# Patient Record
Sex: MALE | Race: WHITE | NOT HISPANIC OR LATINO | Employment: OTHER | URBAN - METROPOLITAN AREA
[De-identification: names, ages, dates, MRNs, and addresses within clinical notes are randomized per-mention and may not be internally consistent; named-entity substitution may affect disease eponyms.]

---

## 2018-06-26 DIAGNOSIS — R31.0 GROSS HEMATURIA: Primary | ICD-10-CM

## 2018-06-27 ENCOUNTER — HOSPITAL ENCOUNTER (OUTPATIENT)
Dept: CT IMAGING | Facility: CLINIC | Age: 73
Discharge: HOME | End: 2018-06-27
Attending: UROLOGY | Admitting: UROLOGY
Payer: MEDICARE

## 2018-06-27 DIAGNOSIS — N20.1 CALCULUS OF DISTAL LEFT URETER: Primary | ICD-10-CM

## 2018-06-27 DIAGNOSIS — R31.0 GROSS HEMATURIA: ICD-10-CM

## 2018-06-27 PROCEDURE — 74176 CT ABD & PELVIS W/O CONTRAST: CPT

## 2018-06-27 RX ORDER — CIPROFLOXACIN 2 MG/ML
400 INJECTION, SOLUTION INTRAVENOUS
Status: CANCELLED | OUTPATIENT
Start: 2018-06-29

## 2018-06-28 RX ORDER — ERGOCALCIFEROL 1.25 MG/1
50000 CAPSULE, LIQUID FILLED ORAL
COMMUNITY
End: 2022-08-29

## 2018-06-28 RX ORDER — TRIAMTERENE/HYDROCHLOROTHIAZID 37.5-25 MG
1 TABLET ORAL DAILY
Status: ON HOLD | COMMUNITY
End: 2018-06-29

## 2018-06-29 ENCOUNTER — ANESTHESIA (OUTPATIENT)
Dept: SURGERY | Facility: CLINIC | Age: 73
End: 2018-06-29
Payer: MEDICARE

## 2018-06-29 ENCOUNTER — APPOINTMENT (OUTPATIENT)
Dept: GENERAL RADIOLOGY | Facility: CLINIC | Age: 73
End: 2018-06-29
Attending: UROLOGY
Payer: MEDICARE

## 2018-06-29 ENCOUNTER — ANESTHESIA EVENT (OUTPATIENT)
Dept: SURGERY | Facility: CLINIC | Age: 73
End: 2018-06-29
Payer: MEDICARE

## 2018-06-29 ENCOUNTER — SURGERY (OUTPATIENT)
Age: 73
End: 2018-06-29

## 2018-06-29 ENCOUNTER — HOSPITAL ENCOUNTER (OUTPATIENT)
Facility: CLINIC | Age: 73
Discharge: HOME | End: 2018-06-29
Attending: UROLOGY | Admitting: UROLOGY
Payer: MEDICARE

## 2018-06-29 VITALS
OXYGEN SATURATION: 92 % | WEIGHT: 201 LBS | RESPIRATION RATE: 16 BRPM | TEMPERATURE: 97.9 F | HEIGHT: 67 IN | SYSTOLIC BLOOD PRESSURE: 134 MMHG | BODY MASS INDEX: 31.55 KG/M2 | DIASTOLIC BLOOD PRESSURE: 83 MMHG

## 2018-06-29 DIAGNOSIS — N20.1 CALCULUS OF DISTAL LEFT URETER: ICD-10-CM

## 2018-06-29 PROCEDURE — 36000056 ZZH SURGERY LEVEL 3 1ST 30 MIN: Performed by: UROLOGY

## 2018-06-29 PROCEDURE — 88300 SURGICAL PATH GROSS: CPT | Mod: 26 | Performed by: UROLOGY

## 2018-06-29 PROCEDURE — 71000012 ZZH RECOVERY PHASE 1 LEVEL 1 FIRST HR: Performed by: UROLOGY

## 2018-06-29 PROCEDURE — 36000058 ZZH SURGERY LEVEL 3 EA 15 ADDTL MIN: Performed by: UROLOGY

## 2018-06-29 PROCEDURE — C2617 STENT, NON-COR, TEM W/O DEL: HCPCS | Performed by: UROLOGY

## 2018-06-29 PROCEDURE — 37000009 ZZH ANESTHESIA TECHNICAL FEE, EACH ADDTL 15 MIN: Performed by: UROLOGY

## 2018-06-29 PROCEDURE — 25000128 H RX IP 250 OP 636: Performed by: NURSE ANESTHETIST, CERTIFIED REGISTERED

## 2018-06-29 PROCEDURE — C1769 GUIDE WIRE: HCPCS | Performed by: UROLOGY

## 2018-06-29 PROCEDURE — 25000128 H RX IP 250 OP 636: Performed by: UROLOGY

## 2018-06-29 PROCEDURE — 25000128 H RX IP 250 OP 636: Performed by: ANESTHESIOLOGY

## 2018-06-29 PROCEDURE — 52356 CYSTO/URETERO W/LITHOTRIPSY: CPT | Mod: LT | Performed by: UROLOGY

## 2018-06-29 PROCEDURE — C1758 CATHETER, URETERAL: HCPCS | Performed by: UROLOGY

## 2018-06-29 PROCEDURE — 40000170 ZZH STATISTIC PRE-PROCEDURE ASSESSMENT II: Performed by: UROLOGY

## 2018-06-29 PROCEDURE — 27210794 ZZH OR GENERAL SUPPLY STERILE: Performed by: UROLOGY

## 2018-06-29 PROCEDURE — C1726 CATH, BAL DIL, NON-VASCULAR: HCPCS | Performed by: UROLOGY

## 2018-06-29 PROCEDURE — 71000027 ZZH RECOVERY PHASE 2 EACH 15 MINS: Performed by: UROLOGY

## 2018-06-29 PROCEDURE — 82365 CALCULUS SPECTROSCOPY: CPT | Performed by: UROLOGY

## 2018-06-29 PROCEDURE — 37000008 ZZH ANESTHESIA TECHNICAL FEE, 1ST 30 MIN: Performed by: UROLOGY

## 2018-06-29 PROCEDURE — 40000277 XR SURGERY CARM FLUORO LESS THAN 5 MIN W STILLS

## 2018-06-29 PROCEDURE — 27210995 ZZH RX 272: Performed by: UROLOGY

## 2018-06-29 PROCEDURE — 88300 SURGICAL PATH GROSS: CPT | Performed by: UROLOGY

## 2018-06-29 PROCEDURE — 25000125 ZZHC RX 250: Performed by: NURSE ANESTHETIST, CERTIFIED REGISTERED

## 2018-06-29 DEVICE — STENT URETERAL CONTOUR SOFT PERCUFLEX 6FRX24CM: Type: IMPLANTABLE DEVICE | Status: FUNCTIONAL

## 2018-06-29 RX ORDER — SODIUM CHLORIDE, SODIUM LACTATE, POTASSIUM CHLORIDE, CALCIUM CHLORIDE 600; 310; 30; 20 MG/100ML; MG/100ML; MG/100ML; MG/100ML
INJECTION, SOLUTION INTRAVENOUS CONTINUOUS PRN
Status: DISCONTINUED | OUTPATIENT
Start: 2018-06-29 | End: 2018-06-29

## 2018-06-29 RX ORDER — SODIUM CHLORIDE, SODIUM LACTATE, POTASSIUM CHLORIDE, CALCIUM CHLORIDE 600; 310; 30; 20 MG/100ML; MG/100ML; MG/100ML; MG/100ML
INJECTION, SOLUTION INTRAVENOUS CONTINUOUS
Status: DISCONTINUED | OUTPATIENT
Start: 2018-06-29 | End: 2018-06-29 | Stop reason: HOSPADM

## 2018-06-29 RX ORDER — ONDANSETRON 4 MG/1
4 TABLET, ORALLY DISINTEGRATING ORAL EVERY 30 MIN PRN
Status: DISCONTINUED | OUTPATIENT
Start: 2018-06-29 | End: 2018-06-29 | Stop reason: HOSPADM

## 2018-06-29 RX ORDER — FENTANYL CITRATE 50 UG/ML
INJECTION, SOLUTION INTRAMUSCULAR; INTRAVENOUS PRN
Status: DISCONTINUED | OUTPATIENT
Start: 2018-06-29 | End: 2018-06-29

## 2018-06-29 RX ORDER — FENTANYL CITRATE 50 UG/ML
25-50 INJECTION, SOLUTION INTRAMUSCULAR; INTRAVENOUS
Status: DISCONTINUED | OUTPATIENT
Start: 2018-06-29 | End: 2018-06-29 | Stop reason: HOSPADM

## 2018-06-29 RX ORDER — LIDOCAINE HYDROCHLORIDE 20 MG/ML
INJECTION, SOLUTION INFILTRATION; PERINEURAL PRN
Status: DISCONTINUED | OUTPATIENT
Start: 2018-06-29 | End: 2018-06-29

## 2018-06-29 RX ORDER — HYDRALAZINE HYDROCHLORIDE 20 MG/ML
2.5-5 INJECTION INTRAMUSCULAR; INTRAVENOUS EVERY 10 MIN PRN
Status: DISCONTINUED | OUTPATIENT
Start: 2018-06-29 | End: 2018-06-29 | Stop reason: HOSPADM

## 2018-06-29 RX ORDER — MEPERIDINE HYDROCHLORIDE 25 MG/ML
12.5 INJECTION INTRAMUSCULAR; INTRAVENOUS; SUBCUTANEOUS
Status: DISCONTINUED | OUTPATIENT
Start: 2018-06-29 | End: 2018-06-29 | Stop reason: HOSPADM

## 2018-06-29 RX ORDER — ALBUTEROL SULFATE 0.83 MG/ML
2.5 SOLUTION RESPIRATORY (INHALATION) EVERY 4 HOURS PRN
Status: DISCONTINUED | OUTPATIENT
Start: 2018-06-29 | End: 2018-06-29 | Stop reason: HOSPADM

## 2018-06-29 RX ORDER — ONDANSETRON 2 MG/ML
INJECTION INTRAMUSCULAR; INTRAVENOUS PRN
Status: DISCONTINUED | OUTPATIENT
Start: 2018-06-29 | End: 2018-06-29

## 2018-06-29 RX ORDER — ONDANSETRON 2 MG/ML
4 INJECTION INTRAMUSCULAR; INTRAVENOUS EVERY 30 MIN PRN
Status: DISCONTINUED | OUTPATIENT
Start: 2018-06-29 | End: 2018-06-29 | Stop reason: HOSPADM

## 2018-06-29 RX ORDER — PROPOFOL 10 MG/ML
INJECTION, EMULSION INTRAVENOUS PRN
Status: DISCONTINUED | OUTPATIENT
Start: 2018-06-29 | End: 2018-06-29

## 2018-06-29 RX ORDER — DEXAMETHASONE SODIUM PHOSPHATE 4 MG/ML
INJECTION, SOLUTION INTRA-ARTICULAR; INTRALESIONAL; INTRAMUSCULAR; INTRAVENOUS; SOFT TISSUE PRN
Status: DISCONTINUED | OUTPATIENT
Start: 2018-06-29 | End: 2018-06-29

## 2018-06-29 RX ORDER — CIPROFLOXACIN 500 MG/1
500 TABLET, FILM COATED ORAL EVERY 12 HOURS
Qty: 4 TABLET | Refills: 0 | Status: SHIPPED | OUTPATIENT
Start: 2018-06-29 | End: 2022-08-29

## 2018-06-29 RX ORDER — ALFUZOSIN HYDROCHLORIDE 10 MG/1
10 TABLET, EXTENDED RELEASE ORAL DAILY
Qty: 60 TABLET | Refills: 5 | Status: SHIPPED | OUTPATIENT
Start: 2018-06-29 | End: 2018-08-17

## 2018-06-29 RX ORDER — NALOXONE HYDROCHLORIDE 0.4 MG/ML
.1-.4 INJECTION, SOLUTION INTRAMUSCULAR; INTRAVENOUS; SUBCUTANEOUS
Status: DISCONTINUED | OUTPATIENT
Start: 2018-06-29 | End: 2018-06-29 | Stop reason: HOSPADM

## 2018-06-29 RX ORDER — CIPROFLOXACIN 2 MG/ML
400 INJECTION, SOLUTION INTRAVENOUS
Status: COMPLETED | OUTPATIENT
Start: 2018-06-29 | End: 2018-06-29

## 2018-06-29 RX ORDER — PROPOFOL 10 MG/ML
INJECTION, EMULSION INTRAVENOUS CONTINUOUS PRN
Status: DISCONTINUED | OUTPATIENT
Start: 2018-06-29 | End: 2018-06-29

## 2018-06-29 RX ORDER — HYDROMORPHONE HYDROCHLORIDE 1 MG/ML
.3-.5 INJECTION, SOLUTION INTRAMUSCULAR; INTRAVENOUS; SUBCUTANEOUS EVERY 10 MIN PRN
Status: DISCONTINUED | OUTPATIENT
Start: 2018-06-29 | End: 2018-06-29 | Stop reason: HOSPADM

## 2018-06-29 RX ADMIN — SODIUM CHLORIDE, POTASSIUM CHLORIDE, SODIUM LACTATE AND CALCIUM CHLORIDE: 600; 310; 30; 20 INJECTION, SOLUTION INTRAVENOUS at 14:37

## 2018-06-29 RX ADMIN — FENTANYL CITRATE 50 MCG: 50 INJECTION INTRAMUSCULAR; INTRAVENOUS at 15:45

## 2018-06-29 RX ADMIN — SODIUM CHLORIDE, POTASSIUM CHLORIDE, SODIUM LACTATE AND CALCIUM CHLORIDE: 600; 310; 30; 20 INJECTION, SOLUTION INTRAVENOUS at 15:22

## 2018-06-29 RX ADMIN — PROPOFOL 175 MCG/KG/MIN: 10 INJECTION, EMULSION INTRAVENOUS at 14:39

## 2018-06-29 RX ADMIN — FENTANYL CITRATE 50 MCG: 50 INJECTION INTRAMUSCULAR; INTRAVENOUS at 15:52

## 2018-06-29 RX ADMIN — DEXAMETHASONE SODIUM PHOSPHATE 4 MG: 4 INJECTION, SOLUTION INTRA-ARTICULAR; INTRALESIONAL; INTRAMUSCULAR; INTRAVENOUS; SOFT TISSUE at 14:45

## 2018-06-29 RX ADMIN — WATER 3000 ML: 100 IRRIGANT IRRIGATION at 15:25

## 2018-06-29 RX ADMIN — PROPOFOL 200 MG: 10 INJECTION, EMULSION INTRAVENOUS at 14:39

## 2018-06-29 RX ADMIN — CIPROFLOXACIN 400 MG: 2 INJECTION INTRAVENOUS at 14:43

## 2018-06-29 RX ADMIN — LIDOCAINE HYDROCHLORIDE 80 MG: 20 INJECTION, SOLUTION INFILTRATION; PERINEURAL at 14:39

## 2018-06-29 RX ADMIN — MIDAZOLAM 2 MG: 1 INJECTION INTRAMUSCULAR; INTRAVENOUS at 14:37

## 2018-06-29 RX ADMIN — FENTANYL CITRATE 50 MCG: 50 INJECTION, SOLUTION INTRAMUSCULAR; INTRAVENOUS at 15:15

## 2018-06-29 RX ADMIN — FENTANYL CITRATE 50 MCG: 50 INJECTION, SOLUTION INTRAMUSCULAR; INTRAVENOUS at 14:39

## 2018-06-29 RX ADMIN — WATER 3000 ML: 100 IRRIGANT IRRIGATION at 14:50

## 2018-06-29 RX ADMIN — ONDANSETRON 4 MG: 2 INJECTION INTRAMUSCULAR; INTRAVENOUS at 14:46

## 2018-06-29 NOTE — OR NURSING
Glasses returned to pt.  1630  PNDS met, po per I&O sheet. Pt dressed, up in recliner and transported to Phase 2.

## 2018-06-29 NOTE — DISCHARGE INSTRUCTIONS
Use CPAP nightly  Cipro 500mg every 12 hours Sat and Sun  Hold Calcium and Vit D pill for next 3 days  Uroxatrol every day with a meal - this will allow you to urinate much better and help with discomfort from the stent  See me in 7-10 days to remove the stent  [facility] will call you with an appointment to see medical kidney stone specialist...please go!!    Same Day Surgery Discharge Instructions for  Sedation and General Anesthesia       It's not unusual to feel dizzy, light-headed or faint for up to 24 hours after surgery or while taking pain medication.  If you have these symptoms: sit for a few minutes before standing and have someone assist you when you get up to walk or use the bathroom.      You should rest and relax for the next 24 hours. We recommend you make arrangements to have an adult stay with you for at least 24 hours after your discharge.  Avoid hazardous and strenuous activity.      DO NOT DRIVE any vehicle or operate mechanical equipment for 24 hours following the end of your surgery.  Even though you may feel normal, your reactions may be affected by the medication you have received.      Do not drink alcoholic beverages for 24 hours following surgery.       Slowly progress to your regular diet as you feel able. It's not unusual to feel nauseated and/or vomit after receiving anesthesia.  If you develop these symptoms, drink clear liquids (apple juice, ginger ale, broth, 7-up, etc. ) until you feel better.  If your nausea and vomiting persists for 24 hours, please notify your surgeon.        All narcotic pain medications, along with inactivity and anesthesia, can cause constipation. Drinking plenty of liquids and increasing fiber intake will help.      For any questions of a medical nature, call your surgeon.      Do not make important decisions for 24 hours.      If you had general anesthesia, you may have a sore throat for a couple of days related to the breathing tube used during  surgery.  You may use Cepacol lozenges to help with this discomfort.  If it worsens or if you develop a fever, contact your surgeon.       If you feel your pain is not well managed with the pain medications prescribed by your surgeon, please contact your surgeon's office to let them know so they can address your concerns.       Cystoscopy and  Holmium Laser Discharge Instructions    Holmium laser lithotripsy was used to break up your kidney stone(s). It is normal to have visible blood in the urine, burning, urgency and frequency following this procedure. These symptoms may last for a few days to weeks.     Diet:    To help pass stone fragments and clear the blood out of the urine, it is important to drink 6-8 glasses of fluids per day at home - at least 3-4 glasses should be water.       Return to the diet that you were on before the procedure, unless you are given specific diet instructions.    Activity:    Walk short distances and increase as your strength allows.    You may climb stairs.    Do not do strenuous exercise or heavy lifting until approved by surgeon.    Do not drive while taking narcotic pain medications.    Bathing:    You may take a shower.           Call your physician if these signs/symptoms are present:    Pain that is not relieved by a short rest or ordered pain medications.    Temperature at or above 101.0 F or chills.    Inability or difficulty urinating.     Excessive blood in urine.    Any questions or concerns.          STENT INFORMATION SHEET  [facility], LYNSEY Barnes M.D.,STEFFI Boss M.D.,   NEW Dunlap M.D., ROSEMARY Durán M.D.   [phone]    During surgery, a stent may be place in the ureter.  The ureter is the tube that drains urine from the kidney to the bladder.  The stent is placed to dilate (open) the ureter so the stone fragments can pass easily through the ureter or to decrease ureteral swelling after surgery, or to relieve an obstruction.    The stent is made of  rubber.  The upper end of the stent curls in the kidney while the lower end rests in the bladder.    While the stent is in place you may experience the following symptoms:    Blood and/or small blood clots in urine.    Bladder spasm (frequency and urgency of urination).    Discomfort or aching in the back or side where the stent is.    Burning or discomfort at the end of urine stream.    To decrease these symptoms you should:    Take pain medication as prescribed.    Drink plenty of fluids.    If you experience pain at the end of urination try not emptying your bladder completely.    If having discomfort in back or side, decrease activity.    Please call your physician or the physician on call if you experience:    Fever greater than 101 .    Severe pain not relieved by pain medication or rest.      Please make an appointment for removal of the stent according to your physician s instructions.    **If you have questions or concerns about your procedure,  call Dr. Barnes at [phone]**

## 2018-06-29 NOTE — IP AVS SNAPSHOT
[facility] [address]    [place] [address]    Phone:  [phone]                                       After Visit Summary   6/29/2018    Carroll Riggins    MRN: [id number]           After Visit Summary Signature Page     I have received my discharge instructions, and my questions have been answered. I have discussed any challenges I see with this plan with the nurse or doctor.    ..........................................................................................................................................  Patient/Patient Representative Signature      ..........................................................................................................................................  Patient Representative Print Name and Relationship to Patient    ..................................................               ................................................  Date                                            Time    ..........................................................................................................................................  Reviewed by Signature/Title    ...................................................              ..............................................  Date                                                            Time

## 2018-06-29 NOTE — ANESTHESIA PREPROCEDURE EVALUATION
Procedure: Procedure(s):  COMBINED CYSTOSCOPY, URETEROSCOPY, LASER HOLMIUM LITHOTRIPSY URETER(S), INSERT STENT  Preop diagnosis: LEFT URETERAL STONE    Allergies   Allergen Reactions     Sulfa Drugs Rash     Pt thinks that he is not allergic to this anymore. He said that this was a child summers allergy and he takes flomax and doctor says it has sulfa in it.. So probably okay. Please Review.     Toprol Xl [Metoprolol] Other (See Comments)     Chronic fatigue      Erythromycin Rash     Penicillin V Rash     Past Medical History:   Diagnosis Date     Arthritis      Hyperlipidemia      Hypertension      Sleep apnea     uses CPAP     Past Surgical History:   Procedure Laterality Date     ARTHROPLASTY REVISION KNEE  6/30/2014    Procedure: ARTHROPLASTY REVISION KNEE;  Surgeon: John Henning MD;  Location: [facility] OR     EYE SURGERY      Lt cataract     ORTHOPEDIC SURGERY      Left. Right; partial  l shoulder     Prior to Admission medications    Medication Sig Start Date End Date Taking? Authorizing Provider   Calcium Carb-Cholecalciferol (CALCIUM + D3 PO) Take by mouth daily   Yes Reported, Patient   GABAPENTIN PO Take 300 mg by mouth At Bedtime   Yes Reported, Patient   Irbesartan (AVAPRO PO) Take 150 mg by mouth 2 times daily   Yes Reported, Patient   metroNIDAZOLE (METROCREAM) 0.75 % cream Apply topically 2 times daily   Yes Reported, Patient   triamterene-hydrochlorothiazide (MAXZIDE-25) 37.5-25 MG per tablet Take 1 tablet by mouth daily   Yes Reported, Patient   vitamin D (ERGOCALCIFEROL) 77357 UNIT capsule Take 50,000 Units by mouth   Yes Reported, Patient   ACETAMINOPHEN PO Take 650 mg by mouth every 6 hours as needed for pain    Unknown, Entered By History   Amlodipine Besylate-Valsartan (EXFORGE)  MG TABS Take 1 tablet by mouth daily    Reported, Patient   aspirin 81 MG tablet Take 81 mg by mouth daily    Reported, Patient   cefTAZidime 2 G SOLR Inject 2 g into the vein every 8 hours 7/29/14   Renard,  Jose Mendez MD   diazepam (VALIUM) 2 MG tablet Take 1.5-2.5 tablets (3-5 mg) by mouth every 6 hours as needed for other (spasms) 7/29/14   Jas Rodriguez MD   hydrochlorothiazide (MICROZIDE) 12.5 MG capsule Take 12.5 mg by mouth daily    Reported, Patient   ORDER FOR DME Equipment being ordered: CPAP    Unknown, Entered By History   ORDER FOR DME Equipment being ordered: Other: Hospital bed ( semi electric bed) rental  Treatment Diagnosis: S/P total knee replacement 7/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      Current Outpatient Prescriptions Ordered in Epic   Medication     Calcium Carb-Cholecalciferol (CALCIUM + D3 PO)     GABAPENTIN PO     Irbesartan (AVAPRO PO)     metroNIDAZOLE (METROCREAM) 0.75 % cream     triamterene-hydrochlorothiazide (MAXZIDE-25) 37.5-25 MG per tablet     vitamin D (ERGOCALCIFEROL) 07560 UNIT capsule     ACETAMINOPHEN PO     Amlodipine Besylate-Valsartan (EXFORGE)  MG TABS     aspirin 81 MG tablet     cefTAZidime 2 G SOLR     diazepam (VALIUM) 2 MG tablet     hydrochlorothiazide (MICROZIDE) 12.5 MG capsule     ORDER FOR DME     ORDER FOR DME     ORDER FOR DME     Tamsulosin HCl (FLOMAX PO)     zolpidem (AMBIEN) 10 MG tablet     Wt Readings from Last 1 Encounters:   07/25/14 85.3 kg (188 lb)     Temp Readings from Last 1 Encounters:   07/29/14 36.4  C (97.6  F) (Oral)     BP Readings from Last 6 Encounters:   07/29/14 123/70   07/07/14 155/81   07/03/14 122/74     Pulse Readings from Last 4 Encounters:   07/29/14 66   07/07/14 89   07/03/14 74     Resp Readings from  Last 1 Encounters:   07/29/14 16     SpO2 Readings from Last 1 Encounters:   07/29/14 97%     ECG: left axis deviation    Anesthesia Evaluation     .             ROS/MED HX    ENT/Pulmonary:     (+)sleep apnea, uses CPAP , . .   (-) tobacco use   Neurologic:  - neg neurologic ROS     Cardiovascular:     (+) Dyslipidemia, hypertension----. : . . . :. .       METS/Exercise Tolerance:     Hematologic:  - neg hematologic  ROS       Musculoskeletal:  - neg musculoskeletal ROS       GI/Hepatic:        (-) GERD   Renal/Genitourinary:     (+) Nephrolithiasis ,       Endo:  - neg endo ROS       Psychiatric:  - neg psychiatric ROS       Infectious Disease:         Malignancy:         Other:                     Physical Exam  Normal systems: dental    Airway   Mallampati: III  TM distance: >3 FB  Neck ROM: full    Dental     Cardiovascular   Rhythm and rate: regular and normal      Pulmonary    breath sounds clear to auscultation                    Anesthesia Plan      History & Physical Review      ASA Status:  2 .        Plan for General and LMA with Propofol induction. Maintenance will be Balanced.    PONV prophylaxis:  Ondansetron (or other 5HT-3)       Postoperative Care  Postoperative pain management:  Multi-modal analgesia.      Consents  Anesthetic plan, risks, benefits and alternatives discussed with:  Patient..                          .

## 2018-06-29 NOTE — BRIEF OP NOTE
[facility] Brief Operative Note    Pre-operative diagnosis: LEFT URETERAL STONE   Post-operative diagnosis left ureteral calculi     Procedure: EUA, video cystoscopy, balloon dilation of left ureter, holmium laser lithotripsy, stone extractions, left JJ stent (6F X 24cm)   Surgeon(s): Surgeon(s) and Role: Nato Barnes MD - Primary   Estimated blood loss: 5 mL    Specimens:   ID Type Source Tests Collected by Time Destination   1 : LEFT URETERAL STONES Calculus/Stone Ureter, Left STONE ANALYSIS Nato Barnes MD 6/29/2018  3:15 PM       Findings: 2 large stones

## 2018-06-29 NOTE — ANESTHESIA CARE TRANSFER NOTE
Patient: Carroll Riggins    Procedure(s):  VIDEO CYSTOSCOPY LEFT URETEROSCOPY HOLMIUM LASER LITHOTRIPSY AND LEFT STENT  - Wound Class: II-Clean Contaminated    Diagnosis: LEFT URETERAL STONE  Diagnosis Additional Information: No value filed.    Anesthesia Type:   No value filed.     Note:  Airway :Face Mask  Patient transferred to:PACU  Comments: Anesthesia Care Note    Patient: Carroll Riggins    Transferred to: PACU    Patient vital signs: Stable    Airway: FM    Monitors placed. VSS. PIV patent. No change in dentition. Report given to GERBER.      Cassandra Lora CRNA   6/29/2018  Handoff Report: Identifed the Patient, Identified the Reponsible Provider, Reviewed the pertinent medical history, Discussed the surgical course, Reviewed Intra-OP anesthesia mangement and issues during anesthesia, Set expectations for post-procedure period and Allowed opportunity for questions and acknowledgement of understanding      Vitals: (Last set prior to Anesthesia Care Transfer)    CRNA VITALS  6/29/2018 1504 - 6/29/2018 1541      6/29/2018             SpO2: 96 %    Resp Rate (observed): (!)  3    Resp Rate (set): 10                Electronically Signed By: TAMI Ramos CRNA  June 29, 2018  3:41 PM

## 2018-06-29 NOTE — IP AVS SNAPSHOT
MRN:[id number]                      After Visit Summary   6/29/2018    Carroll Riggins    MRN: [id number]           Thank you!     Thank you for choosing [facility] for your care. Our goal is always to provide you with excellent care. Hearing back from our patients is one way we can continue to improve our services. Please take a few minutes to complete the written survey that you may receive in the mail after you visit with us. Thank you!        Patient Information     Date Of Birth          1945        About your hospital stay     You were admitted on:  June 29, 2018 You last received care in the:  [facility] PACU    You were discharged on:  June 29, 2018       Who to Call     For medical emergencies, please call 911.  For non-urgent questions about your medical care, please call your primary care provider or clinic, [phone]  For questions related to your surgery, please call your surgery clinic        Attending Provider     Provider Specialty    Nato Barnes MD Urology       Primary Care Provider Office Phone # Fax #    Farhad Luna [phone]      Further instructions from your care team       Use CPAP nightly  Cipro 500mg every 12 hours Sat and Sun  Hold Calcium and Vit D pill for next 3 days  Uroxatrol every day with a meal - this will allow you to urinate much better and help with discomfort from the stent  See me in 7-10 days to remove the stent  [facility] will call you with an appointment to see medical kidney stone specialist...please go!!    Same Day Surgery Discharge Instructions for  Sedation and General Anesthesia       It's not unusual to feel dizzy, light-headed or faint for up to 24 hours after surgery or while taking pain medication.  If you have these symptoms: sit for a few minutes before standing and have someone assist you when you get up to walk or use the bathroom.      You should rest and relax for the next 24  hours. We recommend you make arrangements to have an adult stay with you for at least 24 hours after your discharge.  Avoid hazardous and strenuous activity.      DO NOT DRIVE any vehicle or operate mechanical equipment for 24 hours following the end of your surgery.  Even though you may feel normal, your reactions may be affected by the medication you have received.      Do not drink alcoholic beverages for 24 hours following surgery.       Slowly progress to your regular diet as you feel able. It's not unusual to feel nauseated and/or vomit after receiving anesthesia.  If you develop these symptoms, drink clear liquids (apple juice, ginger ale, broth, 7-up, etc. ) until you feel better.  If your nausea and vomiting persists for 24 hours, please notify your surgeon.        All narcotic pain medications, along with inactivity and anesthesia, can cause constipation. Drinking plenty of liquids and increasing fiber intake will help.      For any questions of a medical nature, call your surgeon.      Do not make important decisions for 24 hours.      If you had general anesthesia, you may have a sore throat for a couple of days related to the breathing tube used during surgery.  You may use Cepacol lozenges to help with this discomfort.  If it worsens or if you develop a fever, contact your surgeon.       If you feel your pain is not well managed with the pain medications prescribed by your surgeon, please contact your surgeon's office to let them know so they can address your concerns.       Cystoscopy and  Holmium Laser Discharge Instructions    Holmium laser lithotripsy was used to break up your kidney stone(s). It is normal to have visible blood in the urine, burning, urgency and frequency following this procedure. These symptoms may last for a few days to weeks.     Diet:    To help pass stone fragments and clear the blood out of the urine, it is important to drink 6-8 glasses of fluids per day at home - at least  3-4 glasses should be water.       Return to the diet that you were on before the procedure, unless you are given specific diet instructions.    Activity:    Walk short distances and increase as your strength allows.    You may climb stairs.    Do not do strenuous exercise or heavy lifting until approved by surgeon.    Do not drive while taking narcotic pain medications.    Bathing:    You may take a shower.           Call your physician if these signs/symptoms are present:    Pain that is not relieved by a short rest or ordered pain medications.    Temperature at or above 101.0 F or chills.    Inability or difficulty urinating.     Excessive blood in urine.    Any questions or concerns.          STENT INFORMATION SHEET  [facility], LYNSEY Barnes M.D.,STEFFI Boss M.D.,   NEW Dunlap M.D., ROSEMARY Durán M.D.   [phone]    During surgery, a stent may be place in the ureter.  The ureter is the tube that drains urine from the kidney to the bladder.  The stent is placed to dilate (open) the ureter so the stone fragments can pass easily through the ureter or to decrease ureteral swelling after surgery, or to relieve an obstruction.    The stent is made of rubber.  The upper end of the stent curls in the kidney while the lower end rests in the bladder.    While the stent is in place you may experience the following symptoms:    Blood and/or small blood clots in urine.    Bladder spasm (frequency and urgency of urination).    Discomfort or aching in the back or side where the stent is.    Burning or discomfort at the end of urine stream.    To decrease these symptoms you should:    Take pain medication as prescribed.    Drink plenty of fluids.    If you experience pain at the end of urination try not emptying your bladder completely.    If having discomfort in back or side, decrease activity.    Please call your physician or the physician on call if you experience:    Fever greater than 101 .    Severe pain  "not relieved by pain medication or rest.      Please make an appointment for removal of the stent according to your physician s instructions.    **If you have questions or concerns about your procedure,  call Dr. Barnes at [phone]**              Pending Results     Date and Time Order Name Status Description    6/29/2018 1550 Surgical pathology exam In process     6/29/2018 1531 XR Surgery FRANKIE L/T 5 Min Fluoro w Stills In process     6/29/2018 1521 Stone analysis In process             Admission Information     Date & Time Provider Department Dept. Phone    6/29/2018 Nato Barnes MD [facility] PACU [phone]      Your Vitals Were     Blood Pressure Temperature Respirations Height Weight Pulse Oximetry    132/89 97.9  F (36.6  C) (Temporal) 16 1.702 m (5' 7\") 91.2 kg (201 lb) 93%    BMI (Body Mass Index)                   31.48 kg/m2           [employer] Information     [employer] gives you secure access to your electronic health record. If you see a primary care provider, you can also send messages to your care team and make appointments. If you have questions, please call your primary care clinic.  If you do not have a primary care provider, please call [phone] and they will assist you.        Care EveryWhere ID     This is your Care EveryWhere ID. This could be used by other organizations to access your [facility] medical records  [id number]        Equal Access to Services     DAYANA KELLY : Hadii mg Duarte, waaxda luqadaha, qaybta juvenalaldaniel casey . So [facility] [place] [phone].    ATENCIÓN: Si habla español, tiene a meadows disposición servicios gratuitos de asistencia lingüística. Llame al [phone].    We comply with applicable federal civil rights laws and [place] laws. We do not discriminate on the basis of race, color, national origin, age, disability, sex, sexual orientation, or gender identity.               Review of your medicines "      START taking        Dose / Directions    alfuzosin 10 MG 24 hr tablet   Commonly known as:  UROXATRAL   Used for:  Calculus of distal left ureter        Dose:  10 mg   Take 1 tablet (10 mg) by mouth daily With meal   Quantity:  60 tablet   Refills:  5       ciprofloxacin 500 MG tablet   Commonly known as:  CIPRO   Used for:  Calculus of distal left ureter        Dose:  500 mg   Take 1 tablet (500 mg) by mouth every 12 hours Start Saturday morning   Quantity:  4 tablet   Refills:  0         CONTINUE these medicines which have NOT CHANGED        Dose / Directions    ACETAMINOPHEN PO        Dose:  650 mg   Take 650 mg by mouth every 6 hours as needed for pain   Refills:  0       AMLODIPINE BESYLATE PO        Dose:  2.5 mg   Take 2.5 mg by mouth daily   Refills:  0       AVAPRO PO        Dose:  150 mg   Take 150 mg by mouth 2 times daily   Refills:  0       CALCIUM + D3 PO        Take by mouth daily   Refills:  0       diazepam 2 MG tablet   Commonly known as:  VALIUM   Used for:  Arthritis of knee, right        Dose:  2.5-5 mg   Take 1.5-2.5 tablets (3-5 mg) by mouth every 6 hours as needed for other (spasms)   Quantity:  20 tablet   Refills:  0       GABAPENTIN PO        Dose:  300 mg   Take 300 mg by mouth At Bedtime   Refills:  0       hydrochlorothiazide 12.5 MG capsule   Commonly known as:  MICROZIDE        Dose:  12.5 mg   Take 12.5 mg by mouth daily   Refills:  0       metroNIDAZOLE 0.75 % cream   Commonly known as:  METROCREAM        Apply topically 2 times daily   Refills:  0       NONFORMULARY        as needed CBD drops(cannibus)   Refills:  0       order for DME        Equipment being ordered: CPAP   Refills:  0       order for DME   Used for:  Arthritis of knee        Equipment being ordered: Walker Wheels () and Walker () Treatment Diagnosis: Impaired gait   Quantity:  1 each   Refills:  0       order for DME   Used for:  Arthritis of knee, right        Equipment being ordered: Other:  Hospital bed ( semi electric bed) rental Treatment Diagnosis: S/P total knee replacement   Quantity:  1 Units   Refills:  0       vitamin D 66720 UNIT capsule   Commonly known as:  ERGOCALCIFEROL        Dose:  88500 Units   Take 50,000 Units by mouth   Refills:  0         STOP taking     aspirin 81 MG tablet                Where to get your medicines      These medications were sent to [facility] [place], [place] - [address]  [address], [place] [address]     Phone:  [phone]     alfuzosin 10 MG 24 hr tablet    ciprofloxacin 500 MG tablet                Protect others around you: Learn how to safely use, store and throw away your medicines at www.disposemymeds.org.        ANTIBIOTIC INSTRUCTION     You've Been Prescribed an Antibiotic - Now What?  Your healthcare team thinks that you or your loved one might have an infection. Some infections can be treated with antibiotics, which are powerful, life-saving drugs. Like all medications, antibiotics have side effects and should only be used when necessary. There are some important things you should know about your antibiotic treatment.      Your healthcare team may run tests before you start taking an antibiotic.    Your team may take samples (e.g., from your blood, urine or other areas) to run tests to look for bacteria. These test can be important to determine if you need an antibiotic at all and, if you do, which antibiotic will work best.      Within a few days, your healthcare team might change or even stop your antibiotic.    Your team may start you on an antibiotic while they are working to find out what is making you sick.    Your team might change your antibiotic because test results show that a different antibiotic would be better to treat your infection.    In some cases, once your team has more information, they learn that you do not need an antibiotic at all. They may find out that you don't have an infection, or that  the antibiotic you're taking won't work against your infection. For example, an infection caused by a virus can't be treated with antibiotics. Staying on an antibiotic when you don't need it is more likely to be harmful than helpful.      You may experience side effects from your antibiotic.    Like all medications, antibiotics have side effects. Some of these can be serious.    Let you healthcare team know if you have any known allergies when you are admitted to the hospital.    One significant side effect of nearly all antibiotics is the risk of severe and sometimes deadly diarrhea caused by Clostridium difficile (C. Difficile). This occurs when a person takes antibiotics because some good germs are destroyed. Antibiotic use allows C. diificile to take over, putting patients at high risk for this serious infection.    As a patient or caregiver, it is important to understand your or your loved one's antibiotic treatment. It is especially important for caregivers to speak up when patients can't speak for themselves. Here are some important questions to ask your healthcare team.    What infection is this antibiotic treating and how do you know I have that infection?    What side effects might occur from this antibiotic?    How long will I need to take this antibiotic?    Is it safe to take this antibiotic with other medications or supplements (e.g., vitamins) that I am taking?     Are there any special directions I need to know about taking this antibiotic? For example, should I take it with food?    How will I be monitored to know whether my infection is responding to the antibiotic?    What tests may help to make sure the right antibiotic is prescribed for me?      Information provided by:  www.cdc.gov/getsmart  U.S. Department of Health and Human Services  Centers for disease Control and Prevention  National Center for Emerging and Zoonotic Infectious Diseases  Division of Healthcare Quality Promotion              Medication List: This is a list of all your medications and when to take them. Check marks below indicate your daily home schedule. Keep this list as a reference.      Medications           Morning Afternoon Evening Bedtime As Needed    ACETAMINOPHEN PO   Take 650 mg by mouth every 6 hours as needed for pain                                alfuzosin 10 MG 24 hr tablet   Commonly known as:  UROXATRAL   Take 1 tablet (10 mg) by mouth daily With meal                                AMLODIPINE BESYLATE PO   Take 2.5 mg by mouth daily                                AVAPRO PO   Take 150 mg by mouth 2 times daily                                CALCIUM + D3 PO   Take by mouth daily                                ciprofloxacin 500 MG tablet   Commonly known as:  CIPRO   Take 1 tablet (500 mg) by mouth every 12 hours Start Saturday morning                                diazepam 2 MG tablet   Commonly known as:  VALIUM   Take 1.5-2.5 tablets (3-5 mg) by mouth every 6 hours as needed for other (spasms)                                GABAPENTIN PO   Take 300 mg by mouth At Bedtime                                hydrochlorothiazide 12.5 MG capsule   Commonly known as:  MICROZIDE   Take 12.5 mg by mouth daily                                metroNIDAZOLE 0.75 % cream   Commonly known as:  METROCREAM   Apply topically 2 times daily                                NONFORMULARY   as needed CBD drops(cannibus)                                order for DME   Equipment being ordered: CPAP                                order for DME   Equipment being ordered: Walker Wheels () and Walker () Treatment Diagnosis: Impaired gait                                order for DME   Equipment being ordered: Other: Hospital bed ( semi electric bed) rental Treatment Diagnosis: S/P total knee replacement                                vitamin D 17062 UNIT capsule   Commonly known as:  ERGOCALCIFEROL   Take 50,000 Units by mouth

## 2018-06-29 NOTE — OP NOTE
Procedure Date: 06/29/2018      PREOPERATIVE DIAGNOSIS:  Two large left ureteral calculi.      POSTOPERATIVE DIAGNOSIS:  Two large left ureteral calculi.      PROCEDURES:  EUA, video cystopanendoscopy, balloon dilation left ureter, left ureteroscopy, holmium laser lithotripsy, stone extractions, left double-J stent (6-[place]ome[place] x 24 cm).      SURGEON:  Nato Barnes Jr., MD      ANESTHESIA:  General laryngeal mask.      ESTIMATED BLOOD LOSS:  5 mL.      INDICATIONS:  The patient is a 72-year-old male who has had a long history of recurrent calcium urolithiasis and presents with the last week of pain on his left side.  A CT scan shows 2 large stones in the left ureter.  His renal function was normal at 1.12 in 10/2017.  CBC preoperatively was normal.  The patient has had normal serum calcium levels in the past.  Unfortunately, he has not gone to the nephrologist as I have asked him several times.  The patient now presents for stone extractions.  The procedure, alternatives, risks and followup were carefully discussed.      PROCEDURE:  The patient received Cipro 400 mg IV preoperatively.  He was taken to the cystoscopy suite and placed supine on the operating table.  After adequate general laryngeal mask anesthesia, the patient was placed in lithotomy position and his genitalia were prepped and draped in a sterile fashion.  A 22-[place] Storz cystoscope with 30-degree lens and video were used to visualize the urethra and bladder, using water as an irrigant.  The urethra appeared normal.  The prostatic fossa was visually obstructed by lateral lobes of the prostate, a very small middle lobe.  The prostatic mucosa was normal.  The bladder revealed 2+ trabeculation with no stones and normal ureteral orifices.  The left ureteral orifice was catheterized with a Glidewire and this was passed under fluoroscopy, past both stones into the left renal pelvis where it curled.  I then passed a 6 mm balloon catheter over the Glidewire  across the left ureteral orifice and intramural ureter and gently dilated this for 20 seconds using thumb pressure only.  I deflated the balloon then and removed this and left the Glidewire as a safety wire.  I removed the cystoscope and passed the 6.9 Tanzanian Stortz ureteroscope into the bladder and up the ureter.        This first stone was encountered and was very large.  Using the 365 micron holmium laser fiber at 8 armas, I broke the stone into 4 or 5 pieces.  I then moved up to the second stone and broke this into 2 pieces using 8 armas.  I removed the laser fiber and using a helical basket, I extracted the distal stone fragments into the bladder.  I then went up more proximally and basketed out the 2 fragments of the upper ureteral stone.  These fragments were sent for analysis.  The other fragments were irrigated out of the bladder using the cystoscope.  The cystoscope was backloaded onto the Glidewire and a 6-Tanzanian x 24 cm soft contour stent was then placed over the Glidewire in excellent position.  The stent curled nicely in the left renal pelvis and in the bladder and there was efflux of clear urine.  The bladder was emptied and the cystoscope removed.  Rectal exam revealed a benign-feeling prostate and no rectal mass.         KIERAN ALVAREZ JR, MD             D: 2018   T: 2018   MT: CHRISTIAN      Name:     BRYN GARZA   MRN:      [id number]        Account:        [id number]   :      1945           Procedure Date: 2018      Document: [id number]       cc: Jacklyn Luna MD       [facility]

## 2018-07-02 LAB — COPATH REPORT: NORMAL

## 2018-07-06 ENCOUNTER — OFFICE VISIT (OUTPATIENT)
Dept: UROLOGY | Facility: CLINIC | Age: 73
End: 2018-07-06
Payer: MEDICARE

## 2018-07-06 VITALS
BODY MASS INDEX: 30.31 KG/M2 | SYSTOLIC BLOOD PRESSURE: 144 MMHG | WEIGHT: 200 LBS | HEART RATE: 85 BPM | HEIGHT: 68 IN | DIASTOLIC BLOOD PRESSURE: 80 MMHG

## 2018-07-06 DIAGNOSIS — Z79.2 PROPHYLACTIC ANTIBIOTIC: Primary | ICD-10-CM

## 2018-07-06 DIAGNOSIS — N20.1 CALCULUS OF URETER: ICD-10-CM

## 2018-07-06 PROCEDURE — 99024 POSTOP FOLLOW-UP VISIT: CPT | Performed by: UROLOGY

## 2018-07-06 PROCEDURE — 52310 CYSTOSCOPY AND TREATMENT: CPT | Mod: 58 | Performed by: UROLOGY

## 2018-07-06 RX ORDER — TRIAMTERENE/HYDROCHLOROTHIAZID 37.5-25 MG
TABLET ORAL
COMMUNITY
Start: 2017-06-14

## 2018-07-06 RX ORDER — CIPROFLOXACIN 500 MG/1
500 TABLET, FILM COATED ORAL ONCE
Qty: 1 TABLET | Refills: 0 | Status: SHIPPED | OUTPATIENT
Start: 2018-07-06 | End: 2018-07-06

## 2018-07-06 RX ORDER — ASPIRIN 81 MG/1
81 TABLET ORAL
COMMUNITY
Start: 2017-09-06

## 2018-07-06 ASSESSMENT — PAIN SCALES - GENERAL: PAINLEVEL: NO PAIN (0)

## 2018-07-06 NOTE — NURSING NOTE
Chief Complaint   Patient presents with     Cystoscopy     stent removal      Prior to the start of the procedure and with procedural staff participation, I verbally confirmed the patient s identity using two indicators, relevant allergies, that the procedure was appropriate and matched the consent or emergent situation, and that the correct equipment/implants were available. Immediately prior to starting the procedure I conducted the Time Out with the procedural staff and re-confirmed the patient s name, procedure, and site/side. I have wiped the patient off with the povidone-Iodine solution, draped them,  used Lidocaine hydrochloride jelly, and instilled sterile water into the bladder. (The Joint Commission universal protocol was followed.)  Yes    Sedation (Moderate or Deep): None  Rox Ordaz

## 2018-07-06 NOTE — MR AVS SNAPSHOT
"              After Visit Summary   7/6/2018    Carroll Riggins    MRN: [id number]           Patient Information     Date Of Birth          1945        Visit Information        Provider Department      7/6/2018 11:30 AM Nato Barnes MD; [facility] [place]        Today's Diagnoses     Prophylactic antibiotic    -  1    Calculus of ureter          Care Instructions         AFTER YOUR CYSTOSCOPY STENT REMOVAL         You have just completed a cystoscopy, or \"cysto\", which allowed your physician to learn more about your bladder (or to remove a stent placed after surgery). We suggest that you continue to avoid caffeine, fruit juice, and alcohol for the next 24 hours, however, you are encouraged to return to your normal activities.       A few things that are considered normal after your cystoscopy:    * small amount of bleeding (or spotting) that clears within the next 24 hours    * slight burning sensation with urination    * sensation to of needing to avoid more frequently    * the feeling of \"air\" in your urine    * mild discomfort that is relieved with Tylonol        Please contact our office promptly if you:    * develop a fever above 101 degrees    * are unable to urinate    * develop bright red blood that does not stop    * severe pain or swelling        And of course, please contact our office with any concerns or questions [phone]              Follow-ups after your visit        Who to contact     If you have questions or need follow up information about today's clinic visit or your schedule please contact [facility] [place] directly at [phone].  Normal or non-critical lab and imaging results will be communicated to you by MyChart, letter or phone within 4 business days after the clinic has received the results. If you do not hear from us within 7 days, please contact the clinic through MyChart or phone. If you " "have a critical or abnormal lab result, we will notify you by phone as soon as possible.  Submit refill requests through [employer] or call your pharmacy and they will forward the refill request to us. Please allow 3 business days for your refill to be completed.          Additional Information About Your Visit        [employer] Information     [employer] gives you secure access to your electronic health record. If you see a primary care provider, you can also send messages to your care team and make appointments. If you have questions, please call your primary care clinic.  If you do not have a primary care provider, please call [phone] and they will assist you.        Care EveryWhere ID     This is your Care EveryWhere ID. This could be used by other organizations to access your [place] medical records  [id number]        Your Vitals Were     Pulse Height BMI (Body Mass Index)             85 1.715 m (5' 7.5\") 30.86 kg/m2          Blood Pressure from Last 3 Encounters:   07/06/18 144/80   06/29/18 134/83   07/29/14 123/70    Weight from Last 3 Encounters:   07/06/18 90.7 kg (200 lb)   06/29/18 91.2 kg (201 lb)   07/25/14 85.3 kg (188 lb)              We Performed the Following     CYSTOSCOPY W FOREIGN BODY REMOVAL, SIMPLE (85584)          Today's Medication Changes          These changes are accurate as of 7/6/18 11:56 AM.  If you have any questions, ask your nurse or doctor.               These medicines have changed or have updated prescriptions.        Dose/Directions    * ciprofloxacin 500 MG tablet   Commonly known as:  CIPRO   This may have changed:  Another medication with the same name was added. Make sure you understand how and when to take each.   Used for:  Calculus of distal left ureter   Changed by:  Nato Barnes MD        Dose:  500 mg   Take 1 tablet (500 mg) by mouth every 12 hours Start Saturday morning   Quantity:  4 tablet   Refills:  0       * ciprofloxacin 500 MG tablet   Commonly known as: "  CIPRO   This may have changed:  You were already taking a medication with the same name, and this prescription was added. Make sure you understand how and when to take each.   Used for:  Prophylactic antibiotic   Changed by:  Nato Barnes MD        Dose:  500 mg   Take 1 tablet (500 mg) by mouth once for 1 dose   Quantity:  1 tablet   Refills:  0       * Notice:  This list has 2 medication(s) that are the same as other medications prescribed for you. Read the directions carefully, and ask your doctor or other care provider to review them with you.         Where to get your medicines      These medications were sent to [facility], [place] - [address]  [address], [facility] [address]     Phone:  [phone]     ciprofloxacin 500 MG tablet                Primary Care Provider Office Phone # Fax #    Farhad Luna [phone]       [facility] [address]  [facility] [address]        Equal Access to Services     [facility]: Hadii aad ku hadasho Soomaali, waaxda luqadaha, qaybta kaalmada adeegyada, waxay idiin hayvini barney . So [facility] [phone].    ATENCIÓN: Si habla español, tiene a meadows disposición servicios gratuitos de asistencia lingüística. Ll[facility] [phone].    We comply with applicable federal civil rights laws and [place] laws. We do not discriminate on the basis of race, color, national origin, age, disability, sex, sexual orientation, or gender identity.            Thank you!     Thank you for choosing [facility] [place]  for your care. Our goal is always to provide you with excellent care. Hearing back from our patients is one way we can continue to improve our services. Please take a few minutes to complete the written survey that you may receive in the mail after your visit with us. Thank you!             Your Updated Medication List - Protect others around you: Learn how  to safely use, store and throw away your medicines at www.disposemymeds.org.          This list is accurate as of 7/6/18 11:56 AM.  Always use your most recent med list.                   Brand Name Dispense Instructions for use Diagnosis    ACETAMINOPHEN PO      Take 650 mg by mouth every 6 hours as needed for pain        alfuzosin 10 MG 24 hr tablet    UROXATRAL    60 tablet    Take 1 tablet (10 mg) by mouth daily With meal    Calculus of distal left ureter       AMLODIPINE BESYLATE PO      Take 2.5 mg by mouth daily        aspirin 81 MG EC tablet      Take 81 mg by mouth        AVAPRO PO      Take 150 mg by mouth 2 times daily        CALCIUM + D3 PO      Take by mouth daily        * ciprofloxacin 500 MG tablet    CIPRO    4 tablet    Take 1 tablet (500 mg) by mouth every 12 hours Start Saturday morning    Calculus of distal left ureter       * ciprofloxacin 500 MG tablet    CIPRO    1 tablet    Take 1 tablet (500 mg) by mouth once for 1 dose    Prophylactic antibiotic       diazepam 2 MG tablet    VALIUM    20 tablet    Take 1.5-2.5 tablets (3-5 mg) by mouth every 6 hours as needed for other (spasms)    Arthritis of knee, right       GABAPENTIN PO      Take 300 mg by mouth At Bedtime        hydrochlorothiazide 12.5 MG capsule    MICROZIDE     Take 12.5 mg by mouth daily        metroNIDAZOLE 0.75 % cream    METROCREAM     Apply topically 2 times daily        NONFORMULARY      as needed CBD drops(cannibus)        order for DME      Equipment being ordered: CPAP        order for DME     1 each    Equipment being ordered: Walker Wheels () and Walker () Treatment Diagnosis: Impaired gait    Arthritis of knee       order for DME     1 Units    Equipment being ordered: Other: Hospital bed ( semi electric bed) rental Treatment Diagnosis: S/P total knee replacement    Arthritis of knee, right       triamterene-hydrochlorothiazide 37.5-25 MG per tablet    MAXZIDE-25          vitamin D 15473 UNIT capsule     ERGOCALCIFEROL     Take 50,000 Units by mouth        * Notice:  This list has 2 medication(s) that are the same as other medications prescribed for you. Read the directions carefully, and ask your doctor or other care provider to review them with you.

## 2018-07-06 NOTE — PATIENT INSTRUCTIONS
"     AFTER YOUR CYSTOSCOPY STENT REMOVAL         You have just completed a cystoscopy, or \"cysto\", which allowed your physician to learn more about your bladder (or to remove a stent placed after surgery). We suggest that you continue to avoid caffeine, fruit juice, and alcohol for the next 24 hours, however, you are encouraged to return to your normal activities.       A few things that are considered normal after your cystoscopy:    * small amount of bleeding (or spotting) that clears within the next 24 hours    * slight burning sensation with urination    * sensation to of needing to avoid more frequently    * the feeling of \"air\" in your urine    * mild discomfort that is relieved with Tylonol        Please contact our office promptly if you:    * develop a fever above 101 degrees    * are unable to urinate    * develop bright red blood that does not stop    * severe pain or swelling        And of course, please contact our office with any concerns or questions [phone]      "

## 2018-07-06 NOTE — LETTER
7/6/2018       RE: Carroll Riggins  [address] [address]     Dear Colleague,    Thank you for referring your patient, Carroll Riggins, to the [facility] [place] at [facility]. Please see a copy of my visit note below.    Carroll Riggins is a 72-year-old male with a long history of calcium oxalate urolithiasis. He underwent ureteroscopic holmium laser and extraction of 2 very large left ureteral stones last week. He has several bilateral stones in the kidneys of smaller size. He has not seen a nephrologist in all these years. He has a creatinine of 1.21 most recently, normal calcium levels and urine pH of 5.0.  He now returns for stent removal. I have made him an appointment at [facility] that he must attend.  Other past medical history: Arthritis, chronic pain, hyperlipidemia, hypertension, rosacea, sleep apnea, vertigo, knee arthroplasty, eye surgeries, nonsmoker  Medications: Tylenol, Uroxatral, amlodipine, low-dose aspirin, calcium/vitamin D, Valium, gabapentin, hydrochlorothiazide, Avapro, Maxide, vitamin D  Allergies: Sulfa, IV contrast, penicillin, Toprol, erythromycin  Flexible cystoscopy-normal urethra, stent grasped and removed easily  Assessment: Recurrent calcium urolithiasis  Plan: KUB in 6 months in [place]-he does not return until late May. See nephrology. Cipro 500 mg ×1 today    Again, thank you for allowing me to participate in the care of your patient.      Sincerely,    Nato Barnes MD

## 2018-07-06 NOTE — PROGRESS NOTES
Carroll Riggins is a 72-year-old male with a long history of calcium oxalate urolithiasis. He underwent ureteroscopic holmium laser and extraction of 2 very large left ureteral stones last week. He has several bilateral stones in the kidneys of smaller size. He has not seen a nephrologist in all these years. He has a creatinine of 1.21 most recently, normal calcium levels and urine pH of 5.0.  He now returns for stent removal. I have made him an appointment at [facility] consultants that he must attend.  Other past medical history: Arthritis, chronic pain, hyperlipidemia, hypertension, rosacea, sleep apnea, vertigo, knee arthroplasty, eye surgeries, nonsmoker  Medications: Tylenol, Uroxatral, amlodipine, low-dose aspirin, calcium/vitamin D, Valium, gabapentin, hydrochlorothiazide, Avapro, Maxide, vitamin D  Allergies: Sulfa, IV contrast, penicillin, Toprol, erythromycin  Flexible cystoscopy-normal urethra, stent grasped and removed easily  Assessment: Recurrent calcium urolithiasis  Plan: KUB in 6 months in [place]-he does not return until late May. See nephrology. Cipro 500 mg ×1 today

## 2018-07-09 LAB
APPEARANCE STONE: NORMAL
COMPN STONE: NORMAL
NUMBER STONE: NORMAL
SIZE STONE: NORMAL MM
WT STONE: 336 MG

## 2018-07-19 ASSESSMENT — LIFESTYLE VARIABLES: TOBACCO_USE: 0

## 2018-07-20 NOTE — ANESTHESIA POSTPROCEDURE EVALUATION
Patient: Carroll Riggins    Procedure(s):  VIDEO CYSTOSCOPY LEFT URETEROSCOPY, BALLOON DILATION OF URETER, HOLMIUM LASER LITHOTRIPSY, STONE EXTRACTION AND LEFT STENT PLACEMENT  - Wound Class: II-Clean Contaminated   - Wound Class: II-Clean Contaminated    Diagnosis:LEFT URETERAL STONE  Diagnosis Additional Information: No value filed.    Anesthesia Type:  No value filed.    Note:  Anesthesia Post Evaluation    Patient location during evaluation: PACU  Patient participation: Able to fully participate in evaluation  Level of consciousness: awake and alert  Pain management: adequate  Airway patency: patent  Cardiovascular status: acceptable  Respiratory status: acceptable  Hydration status: acceptable  PONV: none     Anesthetic complications: None          Last vitals:  Vitals:    06/29/18 1615 06/29/18 1630 06/29/18 1718   BP: 132/89 145/89 134/83   Resp: 16 16 16   Temp: 36.6  C (97.9  F)     SpO2: 93% 93% 92%         Electronically Signed By: Christy Neves MD  July 19, 2018  9:30 PM

## 2018-07-23 ENCOUNTER — TELEPHONE (OUTPATIENT)
Dept: UROLOGY | Facility: CLINIC | Age: 73
End: 2018-07-23

## 2018-07-23 NOTE — TELEPHONE ENCOUNTER
Patient called nurse line and inquired about need to take Alfuzosin. Will forward message to MD.

## 2018-08-17 ENCOUNTER — TRANSFERRED RECORDS (OUTPATIENT)
Dept: HEALTH INFORMATION MANAGEMENT | Facility: CLINIC | Age: 73
End: 2018-08-17

## 2018-08-17 DIAGNOSIS — N20.1 CALCULUS OF DISTAL LEFT URETER: ICD-10-CM

## 2018-08-17 RX ORDER — ALFUZOSIN HYDROCHLORIDE 10 MG/1
10 TABLET, EXTENDED RELEASE ORAL DAILY
Qty: 60 TABLET | Refills: 3 | Status: SHIPPED | OUTPATIENT
Start: 2018-08-17 | End: 2019-05-10

## 2018-10-19 DIAGNOSIS — N28.89 RIGHT RENAL MASS: Primary | ICD-10-CM

## 2018-10-24 ENCOUNTER — HOSPITAL ENCOUNTER (OUTPATIENT)
Dept: ULTRASOUND IMAGING | Facility: CLINIC | Age: 73
Discharge: HOME | End: 2018-10-24
Attending: UROLOGY | Admitting: UROLOGY
Payer: MEDICARE

## 2018-10-24 DIAGNOSIS — N28.89 RIGHT RENAL MASS: ICD-10-CM

## 2018-10-24 PROCEDURE — 76770 US EXAM ABDO BACK WALL COMP: CPT

## 2019-03-29 ENCOUNTER — TRANSFERRED RECORDS (OUTPATIENT)
Dept: HEALTH INFORMATION MANAGEMENT | Facility: CLINIC | Age: 74
End: 2019-03-29

## 2019-05-10 DIAGNOSIS — N20.1 CALCULUS OF DISTAL LEFT URETER: ICD-10-CM

## 2019-05-10 RX ORDER — ALFUZOSIN HYDROCHLORIDE 10 MG/1
TABLET, EXTENDED RELEASE ORAL
Qty: 60 TABLET | Refills: 1 | Status: SHIPPED | OUTPATIENT
Start: 2019-05-10 | End: 2019-09-03

## 2019-06-05 DIAGNOSIS — N20.0 KIDNEY STONE: Primary | ICD-10-CM

## 2019-06-12 ENCOUNTER — OFFICE VISIT (OUTPATIENT)
Dept: UROLOGY | Facility: CLINIC | Age: 74
End: 2019-06-12
Payer: MEDICARE

## 2019-06-12 VITALS
SYSTOLIC BLOOD PRESSURE: 130 MMHG | WEIGHT: 200 LBS | OXYGEN SATURATION: 98 % | HEART RATE: 79 BPM | HEIGHT: 67 IN | DIASTOLIC BLOOD PRESSURE: 64 MMHG | BODY MASS INDEX: 31.39 KG/M2

## 2019-06-12 DIAGNOSIS — N20.0 KIDNEY STONE: ICD-10-CM

## 2019-06-12 LAB
ALBUMIN UR-MCNC: NEGATIVE MG/DL
APPEARANCE UR: CLEAR
BILIRUB UR QL STRIP: NEGATIVE
COLOR UR AUTO: YELLOW
GLUCOSE UR STRIP-MCNC: NEGATIVE MG/DL
HGB UR QL STRIP: NEGATIVE
KETONES UR STRIP-MCNC: NEGATIVE MG/DL
LEUKOCYTE ESTERASE UR QL STRIP: NEGATIVE
NITRATE UR QL: NEGATIVE
PH UR STRIP: 6 PH (ref 5–7)
SOURCE: NORMAL
SP GR UR STRIP: <=1.005 (ref 1–1.03)
UROBILINOGEN UR STRIP-ACNC: 0.2 EU/DL (ref 0.2–1)

## 2019-06-12 PROCEDURE — 99213 OFFICE O/P EST LOW 20 MIN: CPT | Performed by: UROLOGY

## 2019-06-12 PROCEDURE — 81003 URINALYSIS AUTO W/O SCOPE: CPT | Performed by: UROLOGY

## 2019-06-12 ASSESSMENT — PAIN SCALES - GENERAL: PAINLEVEL: NO PAIN (0)

## 2019-06-12 ASSESSMENT — MIFFLIN-ST. JEOR: SCORE: 1615.82

## 2019-06-12 NOTE — PROGRESS NOTES
Carroll is a 73-year-old man with a long history of calcium urolithiasis and last few years he said incomplete urinary retention.  He was hospitalized and [place] in March with a urinary tract infection and had a normal renal ultrasound and a postvoid residual of 165 cc.  A CT scan was also done at that time-I reviewed the films this morning and it shows tiny stones bilaterally in the kidneys, nothing bigger than 2 mm.  He is having no flank pain or gross hematuria.  His postvoid residual today is 10 cc and he has a normal urinalysis with a specific gravity of less than 1.005.  He has not had a digital rectal exam in a while.  There is no family history of prostate cancer  Other past medical history, medications and allergies reviewed  Exam: Alert and oriented, normal appearance, normal vital signs.  Normal respirations, neuro grossly intact.  Normal sphincter tone, no rectal mass or impaction, 30 cc benign prostate with normal seminal vesicles  Review of systems: Right knee pain, left knee replacement apparently is unstable-see Blaine Mclaughlin MD in the future for orthopedic care since he is frustrated with Dr. Henning.  Assessment: Tiny bilateral renal stones, emptying bladder well, normal digital rectal exam  Plan: See me in 12 months for urinalysis, KUB, AZALEA.  Follow-up with Dr. Montero

## 2019-06-12 NOTE — LETTER
6/12/2019       RE: Carroll Riggins  [address] [address]     Dear Colleague,    Thank you for referring your patient, Carroll Riggins, to the [facility] [place] at [facility]. Please see a copy of my visit note below.    Carroll is a 73-year-old man with a long history of calcium urolithiasis and last few years he said incomplete urinary retention.  He was hospitalized and [place] in March with a urinary tract infection and had a normal renal ultrasound and a postvoid residual of 165 cc.  A CT scan was also done at that time-I reviewed the films this morning and it shows tiny stones bilaterally in the kidneys, nothing bigger than 2 mm.  He is having no flank pain or gross hematuria.  His postvoid residual today is 10 cc and he has a normal urinalysis with a specific gravity of less than 1.005.  He has not had a digital rectal exam in a while.  There is no family history of prostate cancer  Other past medical history, medications and allergies reviewed  Exam: Alert and oriented, normal appearance, normal vital signs.  Normal respirations, neuro grossly intact.  Normal sphincter tone, no rectal mass or impaction, 30 cc benign prostate with normal seminal vesicles  Review of systems: Right knee pain, left knee replacement apparently is unstable-see Blaine Mclaughlin MD in the future for orthopedic care since he is frustrated with Dr. Henning.  Assessment: Tiny bilateral renal stones, emptying bladder well, normal digital rectal exam  Plan: See me in 12 months for urinalysis, KUB, AZALEA.  Follow-up with Dr. Montero    Again, thank you for allowing me to participate in the care of your patient.      Sincerely,    Nato Barnes MD

## 2019-06-12 NOTE — LETTER
6/12/2019      RE: Carroll Riggins  [address] [address]       Carroll is a 73-year-old man with a long history of calcium urolithiasis and last few years he said incomplete urinary retention.  He was hospitalized and [place] in March with a urinary tract infection and had a normal renal ultrasound and a postvoid residual of 165 cc.  A CT scan was also done at that time-I reviewed the films this morning and it shows tiny stones bilaterally in the kidneys, nothing bigger than 2 mm.  He is having no flank pain or gross hematuria.  His postvoid residual today is 10 cc and he has a normal urinalysis with a specific gravity of less than 1.005.  He has not had a digital rectal exam in a while.  There is no family history of prostate cancer  Other past medical history, medications and allergies reviewed  Exam: Alert and oriented, normal appearance, normal vital signs.  Normal respirations, neuro grossly intact.  Normal sphincter tone, no rectal mass or impaction, 30 cc benign prostate with normal seminal vesicles  Review of systems: Right knee pain, left knee replacement apparently is unstable-see Blaine Mclaughlin MD in the future for orthopedic care since he is frustrated with Dr. Henning.  Assessment: Tiny bilateral renal stones, emptying bladder well, normal digital rectal exam  Plan: See me in 12 months for urinalysis, KUB, AZALEA.  Follow-up with Dr. Winston Barnes MD

## 2019-09-03 DIAGNOSIS — N20.1 CALCULUS OF DISTAL LEFT URETER: ICD-10-CM

## 2019-09-03 RX ORDER — ALFUZOSIN HYDROCHLORIDE 10 MG/1
TABLET, EXTENDED RELEASE ORAL
Qty: 90 TABLET | Refills: 3 | Status: SHIPPED | OUTPATIENT
Start: 2019-09-03 | End: 2020-08-31

## 2019-11-13 ENCOUNTER — TELEPHONE (OUTPATIENT)
Dept: UROLOGY | Facility: CLINIC | Age: 74
End: 2019-11-13

## 2019-11-13 NOTE — TELEPHONE ENCOUNTER
Called Carroll back to f/u on this message. He reports he is urinating but not in amounts usual for him. His cortisone shots are to his lower back. He reports he's had these before and gets constipated afterwards. Per Dr. Barnes, cortisone shots not affecting urination but nerve compression might, depending on what level of the spine is involved.  He's currently in [place] and won't be back until June 2020.  He's playing in a golf tournament this morning. He does not currently have a Urologist in [place] but states he's going to establish care with one soon.   JONAH Sheldon RN    [facility] Call Center    Phone Message    May a detailed message be left on voicemail: yes    Reason for Call: Other: Patient recently had a cortisone shot and this morning is having trouble urinating.  He still is not not like normal. Please call to discuss.      Action Taken: Message routed to:  [facility] ([facility] Uro

## 2019-12-16 ENCOUNTER — HEALTH MAINTENANCE LETTER (OUTPATIENT)
Age: 74
End: 2019-12-16

## 2020-06-25 DIAGNOSIS — N20.9 CALCIUM UROLITHIASIS: Primary | ICD-10-CM

## 2020-07-27 ENCOUNTER — HOSPITAL ENCOUNTER (OUTPATIENT)
Dept: GENERAL RADIOLOGY | Facility: CLINIC | Age: 75
Discharge: HOME | End: 2020-07-27
Attending: UROLOGY | Admitting: UROLOGY
Payer: MEDICARE

## 2020-07-27 DIAGNOSIS — N20.9 CALCIUM UROLITHIASIS: ICD-10-CM

## 2020-07-27 PROCEDURE — 74019 RADEX ABDOMEN 2 VIEWS: CPT

## 2020-07-28 DIAGNOSIS — N20.9 CALCIUM UROLITHIASIS: Primary | ICD-10-CM

## 2020-07-29 ENCOUNTER — OFFICE VISIT (OUTPATIENT)
Dept: UROLOGY | Facility: CLINIC | Age: 75
End: 2020-07-29
Payer: MEDICARE

## 2020-07-29 VITALS
WEIGHT: 200 LBS | DIASTOLIC BLOOD PRESSURE: 86 MMHG | SYSTOLIC BLOOD PRESSURE: 144 MMHG | OXYGEN SATURATION: 96 % | HEIGHT: 68 IN | BODY MASS INDEX: 30.31 KG/M2 | HEART RATE: 73 BPM

## 2020-07-29 DIAGNOSIS — N20.9 CALCIUM UROLITHIASIS: ICD-10-CM

## 2020-07-29 LAB
ALBUMIN UR-MCNC: NEGATIVE MG/DL
APPEARANCE UR: CLEAR
BILIRUB UR QL STRIP: NEGATIVE
COLOR UR AUTO: YELLOW
GLUCOSE UR STRIP-MCNC: NEGATIVE MG/DL
HGB UR QL STRIP: NEGATIVE
KETONES UR STRIP-MCNC: NEGATIVE MG/DL
LEUKOCYTE ESTERASE UR QL STRIP: NEGATIVE
NITRATE UR QL: NEGATIVE
PH UR STRIP: 7.5 PH (ref 5–7)
RESIDUAL VOLUME (RV) (EXTERNAL): 150
SOURCE: ABNORMAL
SP GR UR STRIP: 1.01 (ref 1–1.03)
UROBILINOGEN UR STRIP-ACNC: 0.2 EU/DL (ref 0.2–1)

## 2020-07-29 PROCEDURE — 99213 OFFICE O/P EST LOW 20 MIN: CPT | Mod: 25 | Performed by: UROLOGY

## 2020-07-29 PROCEDURE — 51798 US URINE CAPACITY MEASURE: CPT | Performed by: UROLOGY

## 2020-07-29 PROCEDURE — 81003 URINALYSIS AUTO W/O SCOPE: CPT | Performed by: UROLOGY

## 2020-07-29 ASSESSMENT — PAIN SCALES - GENERAL: PAINLEVEL: NO PAIN (0)

## 2020-07-29 ASSESSMENT — MIFFLIN-ST. JEOR: SCORE: 1618.75

## 2020-07-29 NOTE — NURSING NOTE
Chief Complaint   Patient presents with     Follow Up     Discuss KUB results.        PVR today was 150ml.   Charlette Soares, CMA

## 2020-07-29 NOTE — LETTER
7/29/2020       RE: Carroll Riggins  [address] [address]     Dear Colleague,    Thank you for referring your patient, Carroll Riggins, to the [facility] [place] at [facility]. Please see a copy of my visit note below.    Carroll Riggins is a 74-year-old male with incomplete urinary retention, complete urinary retention after knee replacement surgery several years ago, calcium urolithiasis and multiple spine in other orthopedic concerns.  Medications essentially are the same except he is now on Lyrica  His KUB shows 2 mm stones bilaterally- 3 or 4 in the left kidney and one in the lower pole of the right kidney.  Upper right kidney is obscured by stool.  In the last year he has had no flank pain or passes stone.  He denies any dysuria or hematuria and has a normal urinalysis.  Specific gravity is 1.015.  Postvoid residual is 150 cc- he voided for the specimen, not to empty his bladder  He complains that Uroxatrol taken in the morning causes him urinary frequency while he is playing golf-he has to void every 3 holes.  He also drinks much more water than he used to and usually has a diet Coke in the morning.  Other past medical history: Hyperlipidemia, hypertension, rosacea, sleep apnea, vertigo, non-smoker  No family history of prostate cancer  Medications reviewed  Allergies reviewed  Review of systems: As above, uses a cane because of left knee problems, chronic spine pain-has had several nerve ablations with his pain therapist  Exam: Alert and oriented, normal appearance, normal vital signs.  Normal respirations, neuro grossly intact.  Normal sphincter tone, no rectal mass or impaction, benign and symmetric prostate, normal seminal vesicles  Assessment: BPH, stable urolithiasis, chronic pain.  Recent note from Dr. Montero reviewed-he will see patient yearly  Plan: Yearly urinalysis, postvoid residual, AZALEA,  NAVJOT    Sincerely,    Nato Barnes MD

## 2020-07-29 NOTE — PROGRESS NOTES
Carroll Riggins is a 74-year-old male with incomplete urinary retention, complete urinary retention after knee replacement surgery several years ago, calcium urolithiasis and multiple spine in other orthopedic concerns.  Medications essentially are the same except he is now on Lyrica  His KUB shows 2 mm stones bilaterally- 3 or 4 in the left kidney and one in the lower pole of the right kidney.  Upper right kidney is obscured by stool.  In the last year he has had no flank pain or passes stone.  He denies any dysuria or hematuria and has a normal urinalysis.  Specific gravity is 1.015.  Postvoid residual is 150 cc- he voided for the specimen, not to empty his bladder  He complains that Uroxatrol taken in the morning causes him urinary frequency while he is playing golf-he has to void every 3 holes.  He also drinks much more water than he used to and usually has a diet Coke in the morning.  Other past medical history: Hyperlipidemia, hypertension, rosacea, sleep apnea, vertigo, non-smoker  No family history of prostate cancer  Medications reviewed  Allergies reviewed  Review of systems: As above, uses a cane because of left knee problems, chronic spine pain-has had several nerve ablations with his pain therapist  Exam: Alert and oriented, normal appearance, normal vital signs.  Normal respirations, neuro grossly intact.  Normal sphincter tone, no rectal mass or impaction, benign and symmetric prostate, normal seminal vesicles  Assessment: BPH, stable urolithiasis, chronic pain.  Recent note from Dr. Montero reviewed-he will see patient yearly  Plan: Yearly urinalysis, postvoid residual, AZALEA, KUB

## 2020-08-29 DIAGNOSIS — N20.1 CALCULUS OF DISTAL LEFT URETER: ICD-10-CM

## 2020-08-31 RX ORDER — ALFUZOSIN HYDROCHLORIDE 10 MG/1
TABLET, EXTENDED RELEASE ORAL
Qty: 90 TABLET | Refills: 3 | Status: SHIPPED | OUTPATIENT
Start: 2020-08-31 | End: 2021-06-18

## 2021-01-15 ENCOUNTER — HEALTH MAINTENANCE LETTER (OUTPATIENT)
Age: 76
End: 2021-01-15

## 2021-06-16 ENCOUNTER — HOSPITAL ENCOUNTER (OUTPATIENT)
Dept: GENERAL RADIOLOGY | Facility: CLINIC | Age: 76
Discharge: HOME | End: 2021-06-16
Attending: UROLOGY | Admitting: UROLOGY
Payer: MEDICARE

## 2021-06-16 DIAGNOSIS — N20.9 CALCIUM UROLITHIASIS: ICD-10-CM

## 2021-06-16 PROCEDURE — 74019 RADEX ABDOMEN 2 VIEWS: CPT

## 2021-06-17 DIAGNOSIS — N20.1 CALCULUS OF DISTAL LEFT URETER: Primary | ICD-10-CM

## 2021-06-18 ENCOUNTER — OFFICE VISIT (OUTPATIENT)
Dept: UROLOGY | Facility: CLINIC | Age: 76
End: 2021-06-18
Payer: MEDICARE

## 2021-06-18 VITALS
BODY MASS INDEX: 32.18 KG/M2 | WEIGHT: 205 LBS | SYSTOLIC BLOOD PRESSURE: 140 MMHG | HEART RATE: 89 BPM | DIASTOLIC BLOOD PRESSURE: 80 MMHG | OXYGEN SATURATION: 97 % | HEIGHT: 67 IN

## 2021-06-18 DIAGNOSIS — N13.8 BPH WITH OBSTRUCTION/LOWER URINARY TRACT SYMPTOMS: Primary | ICD-10-CM

## 2021-06-18 DIAGNOSIS — N40.1 BPH WITH OBSTRUCTION/LOWER URINARY TRACT SYMPTOMS: Primary | ICD-10-CM

## 2021-06-18 DIAGNOSIS — N20.0 BILATERAL NEPHROLITHIASIS: ICD-10-CM

## 2021-06-18 LAB
ALBUMIN UR-MCNC: NEGATIVE MG/DL
APPEARANCE UR: CLEAR
BILIRUB UR QL STRIP: NEGATIVE
COLOR UR AUTO: YELLOW
GLUCOSE UR STRIP-MCNC: NEGATIVE MG/DL
HGB UR QL STRIP: NEGATIVE
KETONES UR STRIP-MCNC: NEGATIVE MG/DL
LEUKOCYTE ESTERASE UR QL STRIP: NEGATIVE
NITRATE UR QL: NEGATIVE
PH UR STRIP: 7 PH (ref 5–7)
RESIDUAL VOLUME (RV) (EXTERNAL): 77
SOURCE: NORMAL
SP GR UR STRIP: 1.01 (ref 1–1.03)
UROBILINOGEN UR STRIP-ACNC: 0.2 EU/DL (ref 0.2–1)

## 2021-06-18 PROCEDURE — 51798 US URINE CAPACITY MEASURE: CPT | Performed by: UROLOGY

## 2021-06-18 PROCEDURE — 99214 OFFICE O/P EST MOD 30 MIN: CPT | Mod: 25 | Performed by: UROLOGY

## 2021-06-18 PROCEDURE — 81003 URINALYSIS AUTO W/O SCOPE: CPT | Performed by: UROLOGY

## 2021-06-18 RX ORDER — CHLORAL HYDRATE 500 MG
CAPSULE ORAL
COMMUNITY
Start: 2020-06-23

## 2021-06-18 RX ORDER — UBIDECARENONE 100 MG
100 CAPSULE ORAL
COMMUNITY
Start: 2019-07-11

## 2021-06-18 RX ORDER — ROSUVASTATIN CALCIUM 10 MG/1
5 TABLET, COATED ORAL
COMMUNITY

## 2021-06-18 RX ORDER — POTASSIUM CITRATE 10 MEQ/1
TABLET, EXTENDED RELEASE ORAL
COMMUNITY
Start: 2020-11-16

## 2021-06-18 RX ORDER — ALFUZOSIN HYDROCHLORIDE 10 MG/1
TABLET, EXTENDED RELEASE ORAL
Qty: 90 TABLET | Refills: 3 | Status: SHIPPED | OUTPATIENT
Start: 2021-06-18 | End: 2022-07-29

## 2021-06-18 RX ORDER — BUPROPION HYDROCHLORIDE 150 MG/1
TABLET, EXTENDED RELEASE ORAL
COMMUNITY

## 2021-06-18 RX ORDER — ALLOPURINOL 100 MG/1
TABLET ORAL
COMMUNITY
Start: 2021-06-02

## 2021-06-18 ASSESSMENT — PAIN SCALES - GENERAL: PAINLEVEL: SEVERE PAIN (6)

## 2021-06-18 ASSESSMENT — MIFFLIN-ST. JEOR: SCORE: 1628.5

## 2021-06-18 NOTE — LETTER
"6/18/2021       RE: Carroll Riggins  [address] [address]     Dear Colleague,    Thank you for referring your patient, Carroll Riggins, to the [facility] [place] at [facility]. Please see a copy of my visit note below.    SOUTHDAFEDE  CHIEF COMPLAINT   It was my pleasure to see Carroll Riggins who is a 75 year old male for follow-up of nephrolithiasis and LUTS.      HPI   Carroll Riggins is a very pleasant 75 year old male who presents with a history of nephrolithiasis and LUTS    Prior Dr. 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  Medications essentially are the same except he is now on Lyrica  His KUB shows 2 mm stones bilaterally- 3 or 4 in the left kidney and one in the lower pole of the right kidney.  Upper right kidney is obscured by stool.  In the last year he has had no flank pain or passes stone.  He denies any dysuria or hematuria and has a normal urinalysis.  Specific gravity is 1.015.  Postvoid residual is 150 cc- he voided for the specimen, not to empty his bladder  He complains that Uroxatrol taken in the morning causes him urinary frequency while he is playing golf-he has to void every 3 holes.  He also drinks much more water than he used to and usually has a diet Coke in the morning.\"    TODAY 6/18/21:  He does follow with Dr. Montero in nephrology  Overall he is doing well with minimal symptoms   no issues with kidney stones for the last year  Overall he is urinating well with only some mild frequency when he drinks a pop    AUASS: 1-7-1-4-0-0-0 = 10  QOL = 2  PVR = 77    PHYSICAL EXAM  Patient is a 75 year old  male   Vitals: There were no vitals taken for this visit.  There is " no height or weight on file to calculate BMI.  General Appearance Adult:   Alert, no acute distress, oriented  HENT: throat/mouth:normal, good dentition  Lungs: no respiratory distress, or pursed lip breathing  Heart: No obvious jugular venous distension present  Abdomen: soft, nontender, no organomegaly or masses  Musculoskeltal: extremities normal, no peripheral edema  Skin: no suspicious lesions or rashes  Neuro: Alert, oriented, speech and mentation normal  Psych: affect and mood normal  Gait: Normal    STONE ANALYSIS:  Calculi composed primarily of:   60% calcium oxalate monohydrate,   10% calcium oxalate dihydrate, and   30% calcium phosphate (hydroxy- and carbonate- apatite).     IMAGING:  All pertinent imaging reviewed:    All imaging studies reviewed by me.  I personally reviewed these imaging films.  A formal report from radiology will follow.    KUB 6/16/21:  IMPRESSION: A few tiny stones approximately 0.2 cm in size are noted  within the bilateral kidneys. Elongated calcification at the right  upper abdomen may be cholelithiasis and is again noted. Nonobstructive  bowel. Stable pelvic phleboliths.    ASSESSMENT and PLAN  75-year-old man with history of bilateral nephrolithiasis and BPH    Bilateral nephrolithiasis  -He follows with Dr. Montero in nephrology and is on potassium citrate  -I reviewed his KUB images personally and agree with the radiologist interpretation    BPH with mild LUTS  -He is doing very well on Uroxatrol 10 mg daily  -Prescription for this was refilled  -Order placed for screening PSA at his next lab draw through [facility]  -His last PSA was in 2018    He may follow-up in 1 year with my PA Shi Obrien    Time spent: 15 minutes spent on the date of the encounter doing chart review, history and exam, documentation and further activities as noted above.    Hosea Amaya MD   Urology  [facility]  [facility] Phone: [phone]  [facility]  [facility] Phone: [phone]

## 2021-06-18 NOTE — NURSING NOTE
Chief Complaint   Patient presents with     Follow Up     Calculus of distal left ureter     PVR scan was 77ml today    Marlys Colindres

## 2021-06-18 NOTE — PROGRESS NOTES
"[facility]  CHIEF COMPLAINT   It was my pleasure to see Carroll Riggins who is a 75 year old male for follow-up of nephrolithiasis and LUTS.      HPI   Carroll Riggins is a very pleasant 75 year old male who presents with a history of nephrolithiasis and LUTS    Prior Dr. 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  Medications essentially are the same except he is now on Lyrica  His KUB shows 2 mm stones bilaterally- 3 or 4 in the left kidney and one in the lower pole of the right kidney.  Upper right kidney is obscured by stool.  In the last year he has had no flank pain or passes stone.  He denies any dysuria or hematuria and has a normal urinalysis.  Specific gravity is 1.015.  Postvoid residual is 150 cc- he voided for the specimen, not to empty his bladder  He complains that Uroxatrol taken in the morning causes him urinary frequency while he is playing golf-he has to void every 3 holes.  He also drinks much more water than he used to and usually has a diet Coke in the morning.\"    TODAY 6/18/21:  He does follow with Dr. Montero in nephrology  Overall he is doing well with minimal symptoms   no issues with kidney stones for the last year  Overall he is urinating well with only some mild frequency when he drinks a pop    AUASS: 5-6-7-4-0-0-0 = 10  QOL = 2  PVR = 77    PHYSICAL EXAM  Patient is a 75 year old  male   Vitals: There were no vitals taken for this visit.  There is no height or weight on file to calculate BMI.  General Appearance Adult:   Alert, no acute distress, oriented  HENT: throat/mouth:normal, good dentition  Lungs: no respiratory distress, or pursed lip breathing  Heart: No obvious jugular venous distension present  Abdomen: soft, nontender, no organomegaly or " masses  Musculoskeltal: extremities normal, no peripheral edema  Skin: no suspicious lesions or rashes  Neuro: Alert, oriented, speech and mentation normal  Psych: affect and mood normal  Gait: Normal    STONE ANALYSIS:  Calculi composed primarily of:   60% calcium oxalate monohydrate,   10% calcium oxalate dihydrate, and   30% calcium phosphate (hydroxy- and carbonate- apatite).     IMAGING:  All pertinent imaging reviewed:    All imaging studies reviewed by me.  I personally reviewed these imaging films.  A formal report from radiology will follow.    KUB 6/16/21:  IMPRESSION: A few tiny stones approximately 0.2 cm in size are noted  within the bilateral kidneys. Elongated calcification at the right  upper abdomen may be cholelithiasis and is again noted. Nonobstructive  bowel. Stable pelvic phleboliths.    ASSESSMENT and PLAN  75-year-old man with history of bilateral nephrolithiasis and BPH    Bilateral nephrolithiasis  -He follows with Dr. Montero in nephrology and is on potassium citrate  -I reviewed his KUB images personally and agree with the radiologist interpretation    BPH with mild LUTS  -He is doing very well on Uroxatrol 10 mg daily  -Prescription for this was refilled  -Order placed for screening PSA at his next lab draw through [facility]  -His last PSA was in 2018    He may follow-up in 1 year with my PA Shi Obrien    Time spent: 15 minutes spent on the date of the encounter doing chart review, history and exam, documentation and further activities as noted above.    Hosea Amaya MD   Urology  [facility]  [facility] Phone: [phone]  [facility] Phone: [phone]

## 2021-09-04 ENCOUNTER — HEALTH MAINTENANCE LETTER (OUTPATIENT)
Age: 76
End: 2021-09-04

## 2022-02-19 ENCOUNTER — HEALTH MAINTENANCE LETTER (OUTPATIENT)
Age: 77
End: 2022-02-19

## 2022-05-11 ENCOUNTER — TELEPHONE (OUTPATIENT)
Dept: UROLOGY | Facility: CLINIC | Age: 77
End: 2022-05-11
Payer: MEDICARE

## 2022-05-11 DIAGNOSIS — Z87.442 HISTORY OF KIDNEY STONES: Primary | ICD-10-CM

## 2022-05-11 NOTE — TELEPHONE ENCOUNTER
[facility] Call Center    Phone Message    May a detailed message be left on voicemail: yes     Reason for Call: Order(s): Other:   Reason for requested: Xray/ labs needed for appt per pt  Date needed: 7/20  Provider name: Jose Luis      Action Taken: Other: uro    Travel Screening: Not Applicable

## 2022-07-22 ENCOUNTER — HOSPITAL ENCOUNTER (OUTPATIENT)
Dept: GENERAL RADIOLOGY | Facility: CLINIC | Age: 77
Discharge: HOME | End: 2022-07-22
Attending: UROLOGY | Admitting: UROLOGY
Payer: MEDICARE

## 2022-07-22 DIAGNOSIS — Z87.442 HISTORY OF KIDNEY STONES: ICD-10-CM

## 2022-07-22 PROCEDURE — 74018 RADEX ABDOMEN 1 VIEW: CPT

## 2022-07-29 ENCOUNTER — OFFICE VISIT (OUTPATIENT)
Dept: UROLOGY | Facility: CLINIC | Age: 77
End: 2022-07-29
Payer: MEDICARE

## 2022-07-29 VITALS
DIASTOLIC BLOOD PRESSURE: 70 MMHG | BODY MASS INDEX: 31.07 KG/M2 | SYSTOLIC BLOOD PRESSURE: 130 MMHG | WEIGHT: 205 LBS | HEIGHT: 68 IN

## 2022-07-29 DIAGNOSIS — R97.20 ELEVATED PROSTATE SPECIFIC ANTIGEN (PSA): ICD-10-CM

## 2022-07-29 DIAGNOSIS — N13.8 BPH WITH OBSTRUCTION/LOWER URINARY TRACT SYMPTOMS: Primary | ICD-10-CM

## 2022-07-29 DIAGNOSIS — N20.0 BILATERAL NEPHROLITHIASIS: ICD-10-CM

## 2022-07-29 DIAGNOSIS — N40.1 BPH WITH OBSTRUCTION/LOWER URINARY TRACT SYMPTOMS: Primary | ICD-10-CM

## 2022-07-29 LAB — PSA SERPL-MCNC: 6.2 UG/L (ref 0–4)

## 2022-07-29 PROCEDURE — 99214 OFFICE O/P EST MOD 30 MIN: CPT | Performed by: UROLOGY

## 2022-07-29 PROCEDURE — 84153 ASSAY OF PSA TOTAL: CPT | Performed by: UROLOGY

## 2022-07-29 PROCEDURE — 36415 COLL VENOUS BLD VENIPUNCTURE: CPT | Performed by: UROLOGY

## 2022-07-29 RX ORDER — ALFUZOSIN HYDROCHLORIDE 10 MG/1
TABLET, EXTENDED RELEASE ORAL
Qty: 90 TABLET | Refills: 3 | Status: SHIPPED | OUTPATIENT
Start: 2022-07-29 | End: 2023-07-18

## 2022-07-29 ASSESSMENT — PAIN SCALES - GENERAL: PAINLEVEL: SEVERE PAIN (6)

## 2022-07-29 NOTE — LETTER
"7/29/2022       RE: Carroll Riggins  [address] [facility] [address]     Dear Colleague,    Thank you for referring your patient, Carroll Riggins, to the [facility] [place] at [facility]. Please see a copy of my visit note below.    SOUTHDAFEDE  CHIEF COMPLAINT   It was my pleasure to see Carroll Riggins who is a 75 year old male for follow-up of nephrolithiasis and LUTS.      HPI   Carroll Riggins is a very pleasant 76 year old male who presents with a history of nephrolithiasis and LUTS    Prior Dr. 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  Medications essentially are the same except he is now on Lyrica  His KUB shows 2 mm stones bilaterally- 3 or 4 in the left kidney and one in the lower pole of the right kidney.  Upper right kidney is obscured by stool.  In the last year he has had no flank pain or passes stone.  He denies any dysuria or hematuria and has a normal urinalysis.  Specific gravity is 1.015.  Postvoid residual is 150 cc- he voided for the specimen, not to empty his bladder  He complains that Uroxatrol taken in the morning causes him urinary frequency while he is playing golf-he has to void every 3 holes.  He also drinks much more water than he used to and usually has a diet Coke in the morning.\"    6/18/21:  He does follow with Dr. Montero in nephrology  Overall he is doing well with minimal symptoms   no issues with kidney stones for the last year  Overall he is urinating well with only some mild frequency when he drinks a pop    AUASS: 6-7-7-4-0-0-0 = 10  QOL = 2  PVR = 77    TODAY 7/29/2022:  He is overall doing well with minimal symptoms  He does have some bothersome urgency and " "frequency, though this is mainly related to after he takes his morning diuretic    AUASS: 8-5-3-5-0-0-0 = 10  QOL = 3      PHYSICAL EXAM  Patient is a 76 year old  male   Vitals: Blood pressure 130/70, height 1.715 m (5' 7.5\"), weight 93 kg (205 lb).  Body mass index is 31.63 kg/m .  General Appearance Adult:   Alert, no acute distress, oriented  HENT: throat/mouth:normal, good dentition  Lungs: no respiratory distress, or pursed lip breathing  Heart: No obvious jugular venous distension present  Abdomen: soft, nontender, no organomegaly or masses  Musculoskeltal: extremities normal, no peripheral edema  Skin: no suspicious lesions or rashes  Neuro: Alert, oriented, speech and mentation normal  Psych: affect and mood normal  Gait: Normal    PSA:  7/29/2022  6.20  6/29/2021 5.17  7/25/2018 3.36    STONE ANALYSIS:  Calculi composed primarily of:   60% calcium oxalate monohydrate,   10% calcium oxalate dihydrate, and   30% calcium phosphate (hydroxy- and carbonate- apatite).     IMAGING:  All pertinent imaging reviewed:    All imaging studies reviewed by me.  I personally reviewed these imaging films.  A formal report from radiology will follow.    KUB 6/16/21:  IMPRESSION: A few tiny stones approximately 0.2 cm in size are noted  within the bilateral kidneys. Elongated calcification at the right  upper abdomen may be cholelithiasis and is again noted. Nonobstructive  bowel. Stable pelvic phleboliths.    KUB 7/22/22:  IMPRESSION: 2 mm left renal calculus is unchanged. Possible 2 mm left  renal calculus. Pelvic phleboliths. Findings similar to previous.     ASSESSMENT and PLAN  76-year-old man with history of bilateral nephrolithiasis and BPH and Elevated PSA     Bilateral nephrolithiasis  -He follows with Dr. Montero in nephrology and is on potassium citrate  -I reviewed his KUB images personally and agree with the radiologist interpretation    BPH with mild LUTS  -He is doing very well on Uroxatrol 10 mg " daily  -Prescription for this was refilled    Elevated PSA  - I reviewed his PSA history and trend  - His PSA has been steadily rising with a one-point increase from last year, now up to 6.2  - This resulted following her appointment  - I called and left him a message indicating the need to schedule a prostate MRI and then a virtual visit follow-up with me  - I will have my office follow-up with him to ensure this gets scheduled  - This is an undiagnosed new problem with uncertain prognosis    Time spent: 15 minutes spent on the date of the encounter doing chart review, history and exam, documentation and further activities as noted above.    Hosea Amyaa MD   Urology  [facility]  [facility] Phone: [phone]  [facility] Phone: [phone]

## 2022-07-29 NOTE — PROGRESS NOTES
"[facility]  CHIEF COMPLAINT   It was my pleasure to see Carroll Riggins who is a 75 year old male for follow-up of nephrolithiasis and LUTS.      HPI   Carroll Riggins is a very pleasant 76 year old male who presents with a history of nephrolithiasis and LUTS    Prior Dr. 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  Medications essentially are the same except he is now on Lyrica  His KUB shows 2 mm stones bilaterally- 3 or 4 in the left kidney and one in the lower pole of the right kidney.  Upper right kidney is obscured by stool.  In the last year he has had no flank pain or passes stone.  He denies any dysuria or hematuria and has a normal urinalysis.  Specific gravity is 1.015.  Postvoid residual is 150 cc- he voided for the specimen, not to empty his bladder  He complains that Uroxatrol taken in the morning causes him urinary frequency while he is playing golf-he has to void every 3 holes.  He also drinks much more water than he used to and usually has a diet Coke in the morning.\"    6/18/21:  He does follow with Dr. Montero in nephrology  Overall he is doing well with minimal symptoms   no issues with kidney stones for the last year  Overall he is urinating well with only some mild frequency when he drinks a pop    AUASS: 6-0-2-4-0-0-0 = 10  QOL = 2  PVR = 77    TODAY 7/29/2022:  He is overall doing well with minimal symptoms  He does have some bothersome urgency and frequency, though this is mainly related to after he takes his morning diuretic    AUASS: 7-0-5-5-0-0-0 = 10  QOL = 3      PHYSICAL EXAM  Patient is a 76 year old  male   Vitals: Blood pressure 130/70, height 1.715 m (5' 7.5\"), weight 93 kg (205 lb).  Body mass index is 31.63 kg/m .  General Appearance Adult:   Alert, no acute " distress, oriented  HENT: throat/mouth:normal, good dentition  Lungs: no respiratory distress, or pursed lip breathing  Heart: No obvious jugular venous distension present  Abdomen: soft, nontender, no organomegaly or masses  Musculoskeltal: extremities normal, no peripheral edema  Skin: no suspicious lesions or rashes  Neuro: Alert, oriented, speech and mentation normal  Psych: affect and mood normal  Gait: Normal    PSA:  7/29/2022  6.20  6/29/2021 5.17  7/25/2018 3.36    STONE ANALYSIS:  Calculi composed primarily of:   60% calcium oxalate monohydrate,   10% calcium oxalate dihydrate, and   30% calcium phosphate (hydroxy- and carbonate- apatite).     IMAGING:  All pertinent imaging reviewed:    All imaging studies reviewed by me.  I personally reviewed these imaging films.  A formal report from radiology will follow.    KUB 6/16/21:  IMPRESSION: A few tiny stones approximately 0.2 cm in size are noted  within the bilateral kidneys. Elongated calcification at the right  upper abdomen may be cholelithiasis and is again noted. Nonobstructive  bowel. Stable pelvic phleboliths.    KUB 7/22/22:  IMPRESSION: 2 mm left renal calculus is unchanged. Possible 2 mm left  renal calculus. Pelvic phleboliths. Findings similar to previous.     ASSESSMENT and PLAN  76-year-old man with history of bilateral nephrolithiasis and BPH and Elevated PSA     Bilateral nephrolithiasis  -He follows with Dr. Montero in nephrology and is on potassium citrate  -I reviewed his KUB images personally and agree with the radiologist interpretation    BPH with mild LUTS  -He is doing very well on Uroxatrol 10 mg daily  -Prescription for this was refilled    Elevated PSA  - I reviewed his PSA history and trend  - His PSA has been steadily rising with a one-point increase from last year, now up to 6.2  - This resulted following her appointment  - I called and left him a message indicating the need to schedule a prostate MRI and then a virtual visit  follow-up with me  - I will have my office follow-up with him to ensure this gets scheduled  - This is an undiagnosed new problem with uncertain prognosis    Time spent: 15 minutes spent on the date of the encounter doing chart review, history and exam, documentation and further activities as noted above.    Hosea Amaya MD   Urology  [facility]  [facility] Phone: [phone]  [facility] Phone: [phone]

## 2022-07-29 NOTE — NURSING NOTE
Chief Complaint   Patient presents with     Kidney stones     BPH with LUTS     KUB results and HEIDI Colindres

## 2022-08-11 ENCOUNTER — HOSPITAL ENCOUNTER (OUTPATIENT)
Dept: MRI IMAGING | Facility: HOSPITAL | Age: 77
Discharge: HOME | End: 2022-08-11
Attending: UROLOGY | Admitting: UROLOGY
Payer: MEDICARE

## 2022-08-11 DIAGNOSIS — R97.20 ELEVATED PROSTATE SPECIFIC ANTIGEN (PSA): ICD-10-CM

## 2022-08-11 PROCEDURE — G1010 CDSM STANSON: HCPCS

## 2022-08-11 PROCEDURE — A9585 GADOBUTROL INJECTION: HCPCS | Performed by: UROLOGY

## 2022-08-11 PROCEDURE — 255N000002 HC RX 255 OP 636: Performed by: UROLOGY

## 2022-08-11 RX ORDER — GADOBUTROL 604.72 MG/ML
10 INJECTION INTRAVENOUS ONCE
Status: COMPLETED | OUTPATIENT
Start: 2022-08-11 | End: 2022-08-11

## 2022-08-11 RX ADMIN — GADOBUTROL 10 ML: 604.72 INJECTION INTRAVENOUS at 09:54

## 2022-08-17 ENCOUNTER — VIRTUAL VISIT (OUTPATIENT)
Dept: UROLOGY | Facility: CLINIC | Age: 77
End: 2022-08-17
Payer: MEDICARE

## 2022-08-17 VITALS — HEIGHT: 68 IN | BODY MASS INDEX: 31.07 KG/M2 | WEIGHT: 205 LBS

## 2022-08-17 DIAGNOSIS — N13.8 BPH WITH OBSTRUCTION/LOWER URINARY TRACT SYMPTOMS: ICD-10-CM

## 2022-08-17 DIAGNOSIS — R97.20 ELEVATED PROSTATE SPECIFIC ANTIGEN (PSA): Primary | ICD-10-CM

## 2022-08-17 DIAGNOSIS — N40.1 BPH WITH OBSTRUCTION/LOWER URINARY TRACT SYMPTOMS: ICD-10-CM

## 2022-08-17 PROCEDURE — 99214 OFFICE O/P EST MOD 30 MIN: CPT | Mod: 95 | Performed by: UROLOGY

## 2022-08-17 RX ORDER — CIPROFLOXACIN 500 MG/1
500 TABLET, FILM COATED ORAL 2 TIMES DAILY
Qty: 6 TABLET | Refills: 0 | Status: SHIPPED | OUTPATIENT
Start: 2022-08-17

## 2022-08-17 ASSESSMENT — PAIN SCALES - GENERAL: PAINLEVEL: NO PAIN (0)

## 2022-08-17 NOTE — PROGRESS NOTES
"[facility]  CHIEF COMPLAINT   It was my pleasure to see Carroll Riggins who is a 76 year old male for follow-up of nephrolithiasis and LUTS.      HPI   Carroll Riggins is a very pleasant 76 year old male who presents with a history of nephrolithiasis and LUTS    Prior Dr. 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  Medications essentially are the same except he is now on Lyrica  His KUB shows 2 mm stones bilaterally- 3 or 4 in the left kidney and one in the lower pole of the right kidney.  Upper right kidney is obscured by stool.  In the last year he has had no flank pain or passes stone.  He denies any dysuria or hematuria and has a normal urinalysis.  Specific gravity is 1.015.  Postvoid residual is 150 cc- he voided for the specimen, not to empty his bladder  He complains that Uroxatrol taken in the morning causes him urinary frequency while he is playing golf-he has to void every 3 holes.  He also drinks much more water than he used to and usually has a diet Coke in the morning.\"    6/18/21:  He does follow with Dr. Montero in nephrology  Overall he is doing well with minimal symptoms   no issues with kidney stones for the last year  Overall he is urinating well with only some mild frequency when he drinks a pop    AUASS: 0-9-0-4-0-0-0 = 10  QOL = 2  PVR = 77    7/29/2022:  He is overall doing well with minimal symptoms  He does have some bothersome urgency and frequency, though this is mainly related to after he takes his morning diuretic    AUASS: 6-9-6-5-0-0-0 = 10  QOL = 3    TODAY 8/17/2022:  No issues from the MRI  Follow up today to discuss results     PHYSICAL EXAM  Patient is a 76 year old  male   Vitals: Height 1.715 m (5' 7.5\"), weight 93 kg (205 lb).  Body mass index is 31.63 " kg/m .  General: No evidence of distress   Lungs: normal respiratory effort  Neuro: Alert, oriented, speech and mentation normal  Psych: affect and mood normal     PSA:  7/29/2022  6.20  6/29/2021 5.17  7/25/2018 3.36    STONE ANALYSIS:  Calculi composed primarily of:   60% calcium oxalate monohydrate,   10% calcium oxalate dihydrate, and   30% calcium phosphate (hydroxy- and carbonate- apatite).     IMAGING:  All pertinent imaging reviewed:    All imaging studies reviewed by me.  I personally reviewed these imaging films.  A formal report from radiology will follow.    KUB 6/16/21:  IMPRESSION: A few tiny stones approximately 0.2 cm in size are noted  within the bilateral kidneys. Elongated calcification at the right  upper abdomen may be cholelithiasis and is again noted. Nonobstructive  bowel. Stable pelvic phleboliths.    KUB 7/22/22:  IMPRESSION: 2 mm left renal calculus is unchanged. Possible 2 mm left  renal calculus. Pelvic phleboliths. Findings similar to previous.     MRI PROSTATE 8/11/2022:  FINDINGS:  PERIPHERAL ZONE:   Lesion 1.   Left mid gland posterior peripheral zone measuring 1.2 x 0.7 x 0.7 cm   T2 appearance: Circumscribed, homogenous hypointense focus/mass less than 1.5 cm.   Diffusion-Weighted Imaging: Focal markedly hypointense on ADC and markedly hyperintense on DWI.   Enhancement: Positive.  Prostate margin: No involvement.    Diffusion abnormality meets PI-RADS 4.   Region of interest created for biopsy and/or follow-up.     TRANSITIONAL ZONE: BPH. No suspicious foci.  Lesion 1.      No seminal vesicle invasion.  No pelvic lymphadenopathy.  No lesions in the visualized bones.     PROSTATE GLAND VOLUME: 52 cc                                               IMPRESSION:  1.  Lesion 1 in the left mid gland posterior peripheral zone is assigned PI-RADS CATEGORY 4: High. 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    ASSESSMENT and PLAN  76-year-old man with history of bilateral  nephrolithiasis and BPH and Elevated PSA     Bilateral nephrolithiasis  -He follows with Dr. Montero in nephrology and is on potassium citrate  -I reviewed his KUB images personally and agree with the radiologist interpretation    BPH with mild LUTS  -He is doing very well on Uroxatrol 10 mg daily  -Prescription for this was refilled    Elevated PSA  - I reviewed his PSA history and trend  - His PSA has been steadily rising with a one-point increase from last year, now up to 6.2  - This resulted following her appointment  - I called and left him a message indicating the need to schedule a prostate MRI and then a virtual visit follow-up with me  - I will have my office follow-up with him to ensure this gets scheduled  - This is an undiagnosed new problem with uncertain prognosis    Time spent: 15 minutes spent on the date of the encounter doing chart review, history and exam, documentation and further activities as noted above.    Hosea Amaya MD   Urology  [facility]  [facility] Phone: [phone]  [facility] Phone: [phone]    Carroll is a 76 year old who is being evaluated via a billable video visit.      How would you like to obtain your AVS? MyChart  If the video visit is dropped, the invitation should be resent by: Text to cell phone: [phone]/ email: [email or web address]   Will anyone else be joining your video visit? No      Video-Visit Details    Video Start Time: 4:36 PM    Type of service:  Video Visit    Video End Time:4:46 PM    Originating Location (pt. Location): Home    Distant Location (provider location):  [facility] [place]     Platform used for Video Visit: ThromboVision

## 2022-08-17 NOTE — NURSING NOTE
Chief Complaint   Patient presents with     Follow Up     MRI results     Patient is ready for a video visit    Marlys Colindres

## 2022-08-17 NOTE — LETTER
"8/17/2022       RE: Carroll Riggins  [address] [address]     Dear Colleague,    Thank you for referring your patient, Carroll Riggins, to the [facility] UROLOGY CLINIC [place] at [facility]. Please see a copy of my visit note below.    SOUTHDAFEDE  CHIEF COMPLAINT   It was my pleasure to see Carroll Riggins who is a 76 year old male for follow-up of nephrolithiasis and LUTS.      HPI   Carroll Riggins is a very pleasant 76 year old male who presents with a history of nephrolithiasis and LUTS    Prior Dr. 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  Medications essentially are the same except he is now on Lyrica  His KUB shows 2 mm stones bilaterally- 3 or 4 in the left kidney and one in the lower pole of the right kidney.  Upper right kidney is obscured by stool.  In the last year he has had no flank pain or passes stone.  He denies any dysuria or hematuria and has a normal urinalysis.  Specific gravity is 1.015.  Postvoid residual is 150 cc- he voided for the specimen, not to empty his bladder  He complains that Uroxatrol taken in the morning causes him urinary frequency while he is playing golf-he has to void every 3 holes.  He also drinks much more water than he used to and usually has a diet Coke in the morning.\"    6/18/21:  He does follow with Dr. Montero in nephrology  Overall he is doing well with minimal symptoms   no issues with kidney stones for the last year  Overall he is urinating well with only some mild frequency when he drinks a pop    AUASS: 2-5-2-4-0-0-0 = 10  QOL = 2  PVR = 77    7/29/2022:  He is overall doing well with minimal symptoms  He does have some bothersome urgency and frequency, " "though this is mainly related to after he takes his morning diuretic    AUASS: 6-1-2-5-0-0-0 = 10  QOL = 3    TODAY 8/17/2022:  No issues from the MRI  Follow up today to discuss results     PHYSICAL EXAM  Patient is a 76 year old  male   Vitals: Height 1.715 m (5' 7.5\"), weight 93 kg (205 lb).  Body mass index is 31.63 kg/m .  General: No evidence of distress   Lungs: normal respiratory effort  Neuro: Alert, oriented, speech and mentation normal  Psych: affect and mood normal     PSA:  7/29/2022  6.20  6/29/2021 5.17  7/25/2018 3.36    STONE ANALYSIS:  Calculi composed primarily of:   60% calcium oxalate monohydrate,   10% calcium oxalate dihydrate, and   30% calcium phosphate (hydroxy- and carbonate- apatite).     IMAGING:  All pertinent imaging reviewed:    All imaging studies reviewed by me.  I personally reviewed these imaging films.  A formal report from radiology will follow.    KUB 6/16/21:  IMPRESSION: A few tiny stones approximately 0.2 cm in size are noted  within the bilateral kidneys. Elongated calcification at the right  upper abdomen may be cholelithiasis and is again noted. Nonobstructive  bowel. Stable pelvic phleboliths.    KUB 7/22/22:  IMPRESSION: 2 mm left renal calculus is unchanged. Possible 2 mm left  renal calculus. Pelvic phleboliths. Findings similar to previous.     MRI PROSTATE 8/11/2022:  FINDINGS:  PERIPHERAL ZONE:   Lesion 1.   Left mid gland posterior peripheral zone measuring 1.2 x 0.7 x 0.7 cm   T2 appearance: Circumscribed, homogenous hypointense focus/mass less than 1.5 cm.   Diffusion-Weighted Imaging: Focal markedly hypointense on ADC and markedly hyperintense on DWI.   Enhancement: Positive.  Prostate margin: No involvement.    Diffusion abnormality meets PI-RADS 4.   Region of interest created for biopsy and/or follow-up.     TRANSITIONAL ZONE: BPH. No suspicious foci.  Lesion 1.      No seminal vesicle invasion.  No pelvic lymphadenopathy.  No lesions in the visualized " bones.     PROSTATE GLAND VOLUME: 52 cc                                               IMPRESSION:  1.  Lesion 1 in the left mid gland posterior peripheral zone is assigned PI-RADS CATEGORY 4: High. 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    ASSESSMENT and PLAN  76-year-old man with history of bilateral nephrolithiasis and BPH and Elevated PSA     Bilateral nephrolithiasis  -He follows with Dr. Montero in nephrology and is on potassium citrate  -I reviewed his KUB images personally and agree with the radiologist interpretation    BPH with mild LUTS  -He is doing very well on Uroxatrol 10 mg daily  -Prescription for this was refilled    Elevated PSA  - I reviewed his PSA history and trend  - His PSA has been steadily rising with a one-point increase from last year, now up to 6.2  - This resulted following her appointment  - I called and left him a message indicating the need to schedule a prostate MRI and then a virtual visit follow-up with me  - I will have my office follow-up with him to ensure this gets scheduled  - This is an undiagnosed new problem with uncertain prognosis    Time spent: 15 minutes spent on the date of the encounter doing chart review, history and exam, documentation and further activities as noted above.    Hosea Amaya MD   Urology  [facility]  [facility] Phone: [phone]  [facility] Phone: [phone]    Carroll is a 76 year old who is being evaluated via a billable video visit.      How would you like to obtain your AVS? MyChart  If the video visit is dropped, the invitation should be resent by: Text to cell phone: [phone]/ email: [email or web address]   Will anyone else be joining your video visit? No      Video-Visit Details    Video Start Time: 4:36 PM    Type of service:  Video Visit    Video End Time:4:46 PM    Originating Location (pt. Location): Home    Distant Location (provider  location):  [facility] UROLOGY CLINIC [place]     Platform used for Video Visit: [employer]

## 2022-08-29 ENCOUNTER — OFFICE VISIT (OUTPATIENT)
Dept: UROLOGY | Facility: CLINIC | Age: 77
End: 2022-08-29
Payer: MEDICARE

## 2022-08-29 VITALS
HEIGHT: 68 IN | SYSTOLIC BLOOD PRESSURE: 132 MMHG | DIASTOLIC BLOOD PRESSURE: 64 MMHG | WEIGHT: 205 LBS | HEART RATE: 84 BPM | BODY MASS INDEX: 31.07 KG/M2

## 2022-08-29 DIAGNOSIS — R97.20 ELEVATED PROSTATE SPECIFIC ANTIGEN (PSA): Primary | ICD-10-CM

## 2022-08-29 PROCEDURE — 55700 PR BIOPSY OF PROSTATE,NEEDLE/PUNCH: CPT | Performed by: UROLOGY

## 2022-08-29 PROCEDURE — G0416 PROSTATE BIOPSY, ANY MTHD: HCPCS | Performed by: PATHOLOGY

## 2022-08-29 PROCEDURE — 96372 THER/PROPH/DIAG INJ SC/IM: CPT | Mod: 59 | Performed by: UROLOGY

## 2022-08-29 PROCEDURE — 76942 ECHO GUIDE FOR BIOPSY: CPT | Performed by: UROLOGY

## 2022-08-29 RX ORDER — ALLOPURINOL 100 MG/1
2 TABLET ORAL DAILY
COMMUNITY
Start: 2022-06-03

## 2022-08-29 RX ORDER — GABAPENTIN 100 MG/1
CAPSULE ORAL
COMMUNITY
Start: 2022-03-15

## 2022-08-29 RX ORDER — AMLODIPINE BESYLATE 2.5 MG/1
TABLET ORAL
COMMUNITY
Start: 2022-07-31

## 2022-08-29 RX ORDER — POTASSIUM CITRATE 10 MEQ/1
1 TABLET, EXTENDED RELEASE ORAL 2 TIMES DAILY
COMMUNITY
Start: 2022-06-17

## 2022-08-29 RX ORDER — LIDOCAINE HYDROCHLORIDE 10 MG/ML
10 INJECTION, SOLUTION INFILTRATION; PERINEURAL ONCE
Status: COMPLETED | OUTPATIENT
Start: 2022-08-29 | End: 2022-08-29

## 2022-08-29 RX ORDER — GENTAMICIN 40 MG/ML
80 INJECTION, SOLUTION INTRAMUSCULAR; INTRAVENOUS ONCE
Status: COMPLETED | OUTPATIENT
Start: 2022-08-29 | End: 2022-08-29

## 2022-08-29 RX ADMIN — GENTAMICIN 80 MG: 40 INJECTION, SOLUTION INTRAMUSCULAR; INTRAVENOUS at 09:05

## 2022-08-29 RX ADMIN — LIDOCAINE HYDROCHLORIDE 10 ML: 10 INJECTION, SOLUTION INFILTRATION; PERINEURAL at 10:00

## 2022-08-29 ASSESSMENT — PAIN SCALES - GENERAL: PAINLEVEL: EXTREME PAIN (8)

## 2022-08-29 NOTE — NURSING NOTE
he following medication was given:     MEDICATION:  Lidocaine 1% Soln  ROUTE: Local Infiltration   SITE: Prostate  DOSE: 100mg/10ml  LOT #: [id number]  : Hospira  EXPIRATION DATE: 09/012023  NDC#: 9706-9347-12  Was there drug waste? Yes  Amount of drug waste (mL): 40.  Reason for waste:  As per MD  Multi-dose vial: Yes    Meg Tyson LPN     The following medication was given:     MEDICATION: Gentamicin   ROUTE: IM  SITE: LUQ - Gluteus  DOSE:80mg/2ml  LOT #: [id number]  : KROGNI  EXPIRATION DATE: 03/23  NDC#: 96858-087-66   Was there drug waste? No  Multi-dose vial: Yes    Using a 1 1/2 inch 21 guage needle medication drawn up as ordered with sterile syringe. Using sterile technique, a new 1 1/2 needle 21 gauge placed on syringe and patient cleansed with alcohol pad. Site was mapped out using palm of hand on the greater trochanter and forefinger on iliac crest. Using V technique between middle finger and index finger. Skin was tracted and Injection was given using a 90 degree angle dart method and after aspiration of needle and no blood, medication was slowly given via IM injection. After 10 seconds needle was removed.  Patient tolerated injection well, and bandage placed on site following insertion removal.     Meg Tyson LPN

## 2022-08-29 NOTE — NURSING NOTE
Chief Complaint   Patient presents with     Elevated PSA     Patient is here for Transrectal Ultrasound/MRI Guided Prostate Biopsies      Procedure was explained to patient prior to performing said procedure.  The patient signed the [place] consent form and all questions were answered prior to the procedure.  Any pre-procedural antibiotics were given according to the performing physician's recommendation.  Patient reviewed information on the labels attached to samples and confirmed the accuracy of all of the labels.     Performing Physician:  Dr. Amaya  Antibiotic taken?  yes  Aspirin or other blood thinning medications discontinued 7-10 days:    Time of enema: 7:45am  Patient given Gentamicin intramuscular injection 30 minutes prior to procedure  Yes    Patient reported that he did not take one antibiotic this morning. Instructed him to take one right away, when he gets home. Patient expressed understanding. He will continue to take another one this evening and one tomorrow morning and evening.     Meg Tyson LPN

## 2022-08-29 NOTE — PROGRESS NOTES
PHYSICIANS NOTE: TRANSRECTAL ULTRASOUND AND BIOPSY PROCEDURE: 8/29/2022   RONNY     Sign In   History and Physical Exam reviewed and is unchanged.     Primary Diagnosis: Elevated psa (primary encounter diagnosis)   Prostate cancer     Informed Consent Discussed: Yes. Risks, benefits, alternatives and personnel discussed with patient who consents to proceed.     Sign in Communication: Completed   Time Out: Team Confirms the Correct Patient,   Correct Procedure; Transrectal Ultrasound and Biopsy, Correct Site and Site Marking (not applicable), Correct Position.   Affirmation of Time Out: YES   Sign Out: Sign Out Discussion: Completed   Patient history and ROS confirmed unchanged from last office visit.   Family history for prostate cancer is: unknown   Audible Time Out: Yes     TRUS PROCEDURE WITH BIOPSY     The patient was placed in the lateral decubitus position.     Digital rectal exam was slightly asymmetric with LEFT sided firmness.     The ultrasound probe was placed into the rectum and the prostate visualized.   Approximately five cc plain lidocaine was injected bilaterally into the perioprostatic nerves.     The prostate was visualized in both planes and a hypoechoic lesion identified corresponding to the MRI target.     The total prostate volume was 65.6 gm     The patient underwent biopsy removing 15 total cores. 3 cores from the MRI target lesion and 12 standard template cores.    PSA Tumor Marker   Date Value Ref Range Status   07/29/2022 6.20 (H) 0.00 - 4.00 ug/L Final       ----------     Complications: None     Follow-up: He is on MyChart and would' like to receive results before our scheduled visit. Follow up as scheduled.    Hosea Amaya MD

## 2022-08-29 NOTE — PATIENT INSTRUCTIONS
[facility]  Transrectal Ultrasound  Post Operative Information    The physician who performed your Transrectal Ultrasound is Dr. Amaya (telephone number [phone]).  Please contact this doctor if you have any problems or questions.  If unable to reach your doctor, please return to the Emergency Department.    Take one antibiotic the evening of the procedure and then as directed on your prescription.  Drink at least 6-8 glasses of fluids for the first 48 hours.  Avoid heavy lifting and strenuous activity for 48 hours.  Avoid sexual intercourse for the first 24 hours.  No aspirin or ibuprofen products (Motrin, Advil, Nuprin, ect.) for one week.  You may take acetaminophen (Tylenol) for pain.  You may notice a small amount of blood on the tissue after a bowel movement.  You may pass blood with clots in your urine following the procedure.  The amount will decrease with time but may be visible for up to two weeks.   You make have blood in your semen for 4 weeks after the procedure.  You may experience mild perineal (groin area) discomfort after the procedure.  Please call you doctor if you have any of the follow symptoms:  Fever  Increase in the amount of blood passed  Severe discomfort or pain

## 2022-08-29 NOTE — LETTER
8/29/2022       RE: Carroll Riggins  [address] [facility] [address]     Dear Colleague,    Thank you for referring your patient, Carroll Riggins, to the [facility] UROLOGY CLINIC [place] at [facility]. Please see a copy of my visit note below.    PHYSICIANS NOTE: TRANSRECTAL ULTRASOUND AND BIOPSY PROCEDURE: 8/29/2022   RONNY     Sign In   History and Physical Exam reviewed and is unchanged.     Primary Diagnosis: Elevated psa (primary encounter diagnosis)   Prostate cancer     Informed Consent Discussed: Yes. Risks, benefits, alternatives and personnel discussed with patient who consents to proceed.     Sign in Communication: Completed   Time Out: Team Confirms the Correct Patient,   Correct Procedure; Transrectal Ultrasound and Biopsy, Correct Site and Site Marking (not applicable), Correct Position.   Affirmation of Time Out: YES   Sign Out: Sign Out Discussion: Completed   Patient history and ROS confirmed unchanged from last office visit.   Family history for prostate cancer is: unknown   Audible Time Out: Yes     TRUS PROCEDURE WITH BIOPSY     The patient was placed in the lateral decubitus position.     Digital rectal exam was slightly asymmetric with LEFT sided firmness.     The ultrasound probe was placed into the rectum and the prostate visualized.   Approximately five cc plain lidocaine was injected bilaterally into the perioprostatic nerves.     The prostate was visualized in both planes and a hypoechoic lesion identified corresponding to the MRI target.     The total prostate volume was 65.6 gm     The patient underwent biopsy removing 15 total cores. 3 cores from the MRI target lesion and 12 standard template cores.    PSA Tumor Marker   Date Value Ref Range Status   07/29/2022 6.20 (H) 0.00 - 4.00 ug/L Final       ----------     Complications: None     Follow-up: He is on MyChart and would' like to receive results before our  scheduled visit. Follow up as scheduled.    Hosea Amaya MD

## 2022-08-30 LAB
PATH REPORT.COMMENTS IMP SPEC: ABNORMAL
PATH REPORT.COMMENTS IMP SPEC: ABNORMAL
PATH REPORT.COMMENTS IMP SPEC: YES
PATH REPORT.FINAL DX SPEC: ABNORMAL
PATH REPORT.GROSS SPEC: ABNORMAL
PATH REPORT.MICROSCOPIC SPEC OTHER STN: ABNORMAL
PATH REPORT.RELEVANT HX SPEC: ABNORMAL
PHOTO IMAGE: ABNORMAL

## 2022-09-07 ENCOUNTER — VIRTUAL VISIT (OUTPATIENT)
Dept: UROLOGY | Facility: CLINIC | Age: 77
End: 2022-09-07
Payer: MEDICARE

## 2022-09-07 VITALS — HEIGHT: 68 IN | WEIGHT: 205 LBS | BODY MASS INDEX: 31.07 KG/M2

## 2022-09-07 DIAGNOSIS — C61 PROSTATE CANCER (H): Primary | ICD-10-CM

## 2022-09-07 PROCEDURE — 99214 OFFICE O/P EST MOD 30 MIN: CPT | Mod: 95 | Performed by: UROLOGY

## 2022-09-07 ASSESSMENT — PAIN SCALES - GENERAL: PAINLEVEL: MILD PAIN (3)

## 2022-09-07 NOTE — PROGRESS NOTES
"[facility]  CHIEF COMPLAINT   It was my pleasure to see Carroll Riggins who is a 76 year old male for follow-up of nephrolithiasis and LUTS.      HPI   Carroll Riggins is a very pleasant 76 year old male who presents with a history of nephrolithiasis and LUTS    Prior Dr. 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  Medications essentially are the same except he is now on Lyrica  His KUB shows 2 mm stones bilaterally- 3 or 4 in the left kidney and one in the lower pole of the right kidney.  Upper right kidney is obscured by stool.  In the last year he has had no flank pain or passes stone.  He denies any dysuria or hematuria and has a normal urinalysis.  Specific gravity is 1.015.  Postvoid residual is 150 cc- he voided for the specimen, not to empty his bladder  He complains that Uroxatrol taken in the morning causes him urinary frequency while he is playing golf-he has to void every 3 holes.  He also drinks much more water than he used to and usually has a diet Coke in the morning.\"    6/18/21:  He does follow with Dr. Montero in nephrology  Overall he is doing well with minimal symptoms   no issues with kidney stones for the last year  Overall he is urinating well with only some mild frequency when he drinks a pop    AUASS: 5-7-1-4-0-0-0 = 10  QOL = 2  PVR = 77    7/29/2022:  He is overall doing well with minimal symptoms  He does have some bothersome urgency and frequency, though this is mainly related to after he takes his morning diuretic    AUASS: 2-6-6-5-0-0-0 = 10  QOL = 3    8/17/2022:  No issues from the MRI  Follow up today to discuss results     TODAY 9/7/2022:  Follow-up today to discuss his biopsy results  He did well after the biopsy with no issues    PHYSICAL EXAM  Patient is a 76 " "year old  male   Vitals: Height 1.715 m (5' 7.5\"), weight 93 kg (205 lb).  Body mass index is 31.63 kg/m .  General: No evidence of distress   Lungs: normal respiratory effort  Neuro: Alert, oriented, speech and mentation normal  Psych: affect and mood normal     PSA:  7/29/2022  6.20  6/29/2021 5.17  7/25/2018 3.36    STONE ANALYSIS:  Calculi composed primarily of:   60% calcium oxalate monohydrate,   10% calcium oxalate dihydrate, and   30% calcium phosphate (hydroxy- and carbonate- apatite).     PATHOLOGY:  8/29/2022:  Final Diagnosis   A.  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  Prostate, left base, biopsy-  Adenocarcinoma, Largo's grade 3/4 in 1 of 1 needle core biopsy and 15% of submitted tissue    C.  Prostate, left lateral mid, biopsy-  Adenocarcinoma, Margaret's grade 3/3 with perineural invasion involving 1 of 1 needle core biopsy and 1% of submitted tissue    D.  Prostate, left mid, biopsy-  Adenocarcinoma, Margaret's grade 3/3 with perineural invasion involving 1 of 1 needle core biopsy and 40% of submitted tissue    E.  Prostate, left lateral apex, biopsy-  Benign prostate tissue    F.  Prostate, left apex, biopsy-  Adenocarcinoma, Margaret's grade 3/3 with perineural invasion involving 1 of 1 needle core biopsy and 25% of submitted tissue    G.  Prostate, right lateral base, biopsy-  Adenocarcinoma, Margaret grade 3/3 in 1 of 1 needle core biopsy and 5% of submitted tissue    H.  Prostate, right base, biopsy-  Benign prostate tissue    I.  Prostate, right lateral mid, biopsy-  Atypical small gland proliferation    J.  Prostate, right mid, biopsy-  Benign prostate tissue    K.  Prostate, right lateral apex, biopsy-  Benign prostate tissue    L.  Prostate, right apex, biopsy-  Adenocarcinoma, Largo's grade 3/3 in 2 of 2 needle core biopsies and 40% of submitted " tissue    M.  Prostate, left, lesion, biopsy-  Adenocarcinoma, Margaret's grade 3/4 in 3 of 3 needle core biopsies and 50% of submitted tissue       IMAGING:  All pertinent imaging reviewed:    All imaging studies reviewed by me.  I personally reviewed these imaging films.  A formal report from radiology will follow.    KUB 6/16/21:  IMPRESSION: A few tiny stones approximately 0.2 cm in size are noted  within the bilateral kidneys. Elongated calcification at the right  upper abdomen may be cholelithiasis and is again noted. Nonobstructive  bowel. Stable pelvic phleboliths.    KUB 7/22/22:  IMPRESSION: 2 mm left renal calculus is unchanged. Possible 2 mm left  renal calculus. Pelvic phleboliths. Findings similar to previous.     MRI PROSTATE 8/11/2022:  FINDINGS:  PERIPHERAL ZONE:   Lesion 1.   Left mid gland posterior peripheral zone measuring 1.2 x 0.7 x 0.7 cm   T2 appearance: Circumscribed, homogenous hypointense focus/mass less than 1.5 cm.   Diffusion-Weighted Imaging: Focal markedly hypointense on ADC and markedly hyperintense on DWI.   Enhancement: Positive.  Prostate margin: No involvement.    Diffusion abnormality meets PI-RADS 4.   Region of interest created for biopsy and/or follow-up.     TRANSITIONAL ZONE: BPH. No suspicious foci.  Lesion 1.      No seminal vesicle invasion.  No pelvic lymphadenopathy.  No lesions in the visualized bones.     PROSTATE GLAND VOLUME: 52 cc                                               IMPRESSION:  1.  Lesion 1 in the left mid gland posterior peripheral zone is assigned PI-RADS CATEGORY 4: High. 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    ASSESSMENT and PLAN  76-year-old man with history of bilateral nephrolithiasis and BPH and Elevated PSA. Now with PIRADs 4 lesion on MRI, s/p UroNav biopsy with [place] 3+4=7 prostate cancer with positive cores from 3/3 target and 7/12 template cores.    DISCUSSION PROSTATE CANCER     The natural history of this disease was  explained to the patient at length and the treatment options discussed including radical prostatectomy by open or robotic-assisted laparoscopic approach, external-beam radiotherapy, brachytherapy, and watchful waiting, including the probability of success and complications associated with these approaches.    With respect to watchful waiting, we discussed the difficulties of estimating the extent of disease preoperatively, the risk of cancer progression, and risk that salvage might not be possible with progression. However, the favorable 10-year outcomes of active surveillance in appropriately selected patients was conveyed.  We discussed that active surveillance is the treatment of choice for men with Margaret 6 prostate cancer in select patients with [place] 3+4 = 7 prostate cancer.  I think he would be a reasonable candidate for active surveillance, though we would need to confirm this with a decipher score and he would like to have this sent off.    With respect to seed implants, we discussed risks including but not limited to cancer recurrence, exacerbation of voiding symptoms or hematuria, urinary retention, induction of 2nd malignancy, and risks of rectal symptoms or bleeding.  We also discussed risks of the anesthesia including but not limited to MI, CVA, DVT, and PE.      With respect to EBRT, we discussed we discussed risks including but not limited to cancer recurrence, exacerbation of voiding symptoms or hematuria, urinary retention, incontinence, stricture of the urinary tract, induction of 2nd malignancy, and risks of rectal symptoms or bleeding.  We discussed fact that rectal symptoms may be more common with EBRT than with other treatment modalities.     With radiation therapy, we discussed the difficulties in diagnosing recurrent disease at an early stage due to variability in PSA levels and the fact that most patients are not candidates for local salvage therapy when biochemical recurrence is  declared.  We also discussed the significant morbidity of local salvage therapy in terms of perioperative complications, erectile dysfunction and urinary incontinence.    With respect to radical prostatectomy, the pros and cons of open vs robotic-assisted laparoscopic prostatectomy were discussed. The complications of this procedure were reviewed with the patient and include (but not limited to) urinary incontinence, erectile dysfunction, infertility, anastomotic stricture, lymphocele, hemorrhage requiring transfusion, rectal, ureteral, or nerve injury, infection, cardiovascular, pulmonary, thromboembolic, and anesthetic complications.  For robotic prostatectomy, the additional complications of anastomotic urine leak and small bowel obstruction from adhesions was conveyed. The anticipated post-operative course was explained, including an anticipated 1 night hospital stay.    With surgery, we also discussed the potential advantage over radiation therapy in that biochemical recurrence can be detected at a relatively earlier stage and that salvage radiotherapy is successful in controlling recurrent disease in a substantial proportion of patients.  I also conveyed that salvage radiotherapy was associated with a considerably more favorable morbidity profile compared to local salvage therapies for radiorecurent disease.  We discussed that as patients age we then tend to favor radiation over surgery and I think that he would be an excellent radiation candidate to go surgery would not be out of the question.    All questions were answered.     Plan:  - decipher  - Rad Onc  - He will call [place]    Bilateral nephrolithiasis  -He follows with Dr. Montero in nephrology and is on potassium citrate  -I reviewed his KUB images personally and agree with the radiologist interpretation    BPH with mild LUTS  -He is doing very well on Uroxatrol 10 mg daily  -Prescription for this was refilled      Time spent: 25 minutes spent on the date  of the encounter doing chart review, history and exam, documentation and further activities as noted above.  Telephone time: 21 minutes    Hosea Amaya MD   Urology  [facility]  [facility] Phone: [phone]  [facility] Phone: [phone]      Carroll is a 76 year old who is being evaluated via a billable video visit.      How would you like to obtain your AVS? MyChart  If the video visit is dropped, the invitation should be resent by: Text to cell phone: [phone]  Will anyone else be joining your video visit? No        Video-Visit Details -converted to telephone only due to patient connection issues

## 2022-09-07 NOTE — LETTER
"9/7/2022       RE: Carroll Riggins  [address] [facility] [address]     Dear Colleague,    Thank you for referring your patient, Carroll Riggins, to the [facility] UROLOGY CLINIC [place] at [facility]. Please see a copy of my visit note below.    SOUTHDAFEDE  CHIEF COMPLAINT   It was my pleasure to see Carroll Riggins who is a 76 year old male for follow-up of nephrolithiasis and LUTS.      HPI   Carroll Riggins is a very pleasant 76 year old male who presents with a history of nephrolithiasis and LUTS    Prior Dr. 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  Medications essentially are the same except he is now on Lyrica  His KUB shows 2 mm stones bilaterally- 3 or 4 in the left kidney and one in the lower pole of the right kidney.  Upper right kidney is obscured by stool.  In the last year he has had no flank pain or passes stone.  He denies any dysuria or hematuria and has a normal urinalysis.  Specific gravity is 1.015.  Postvoid residual is 150 cc- he voided for the specimen, not to empty his bladder  He complains that Uroxatrol taken in the morning causes him urinary frequency while he is playing golf-he has to void every 3 holes.  He also drinks much more water than he used to and usually has a diet Coke in the morning.\"    6/18/21:  He does follow with Dr. Montero in nephrology  Overall he is doing well with minimal symptoms   no issues with kidney stones for the last year  Overall he is urinating well with only some mild frequency when he drinks a pop    AUASS: 4-4-8-4-0-0-0 = 10  QOL = 2  PVR = 77    7/29/2022:  He is overall doing well with minimal symptoms  He does have some bothersome urgency and frequency, " "though this is mainly related to after he takes his morning diuretic    AUASS: 0-8-3-5-0-0-0 = 10  QOL = 3    8/17/2022:  No issues from the MRI  Follow up today to discuss results     TODAY 9/7/2022:  Follow-up today to discuss his biopsy results  He did well after the biopsy with no issues    PHYSICAL EXAM  Patient is a 76 year old  male   Vitals: Height 1.715 m (5' 7.5\"), weight 93 kg (205 lb).  Body mass index is 31.63 kg/m .  General: No evidence of distress   Lungs: normal respiratory effort  Neuro: Alert, oriented, speech and mentation normal  Psych: affect and mood normal     PSA:  7/29/2022  6.20  6/29/2021 5.17  7/25/2018 3.36    STONE ANALYSIS:  Calculi composed primarily of:   60% calcium oxalate monohydrate,   10% calcium oxalate dihydrate, and   30% calcium phosphate (hydroxy- and carbonate- apatite).     PATHOLOGY:  8/29/2022:  Final Diagnosis   A.  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  Prostate, left base, biopsy-  Adenocarcinoma, [place]'s grade 3/4 in 1 of 1 needle core biopsy and 15% of submitted tissue    C.  Prostate, left lateral mid, biopsy-  Adenocarcinoma, [place]'s grade 3/3 with perineural invasion involving 1 of 1 needle core biopsy and 1% of submitted tissue    D.  Prostate, left mid, biopsy-  Adenocarcinoma, Margaret's grade 3/3 with perineural invasion involving 1 of 1 needle core biopsy and 40% of submitted tissue    E.  Prostate, left lateral apex, biopsy-  Benign prostate tissue    F.  Prostate, left apex, biopsy-  Adenocarcinoma, Margaret's grade 3/3 with perineural invasion involving 1 of 1 needle core biopsy and 25% of submitted tissue    G.  Prostate, right lateral base, biopsy-  Adenocarcinoma, Margaret grade 3/3 in 1 of 1 needle core biopsy and 5% of submitted tissue    H.  Prostate, right base, biopsy-  Benign prostate tissue    I.  Prostate, " right lateral mid, biopsy-  Atypical small gland proliferation    J.  Prostate, right mid, biopsy-  Benign prostate tissue    K.  Prostate, right lateral apex, biopsy-  Benign prostate tissue    L.  Prostate, right apex, biopsy-  Adenocarcinoma, [place]'s grade 3/3 in 2 of 2 needle core biopsies and 40% of submitted tissue    M.  Prostate, left, lesion, biopsy-  Adenocarcinoma, [place]'s grade 3/4 in 3 of 3 needle core biopsies and 50% of submitted tissue       IMAGING:  All pertinent imaging reviewed:    All imaging studies reviewed by me.  I personally reviewed these imaging films.  A formal report from radiology will follow.    KUB 6/16/21:  IMPRESSION: A few tiny stones approximately 0.2 cm in size are noted  within the bilateral kidneys. Elongated calcification at the right  upper abdomen may be cholelithiasis and is again noted. Nonobstructive  bowel. Stable pelvic phleboliths.    KUB 7/22/22:  IMPRESSION: 2 mm left renal calculus is unchanged. Possible 2 mm left  renal calculus. Pelvic phleboliths. Findings similar to previous.     MRI PROSTATE 8/11/2022:  FINDINGS:  PERIPHERAL ZONE:   Lesion 1.   Left mid gland posterior peripheral zone measuring 1.2 x 0.7 x 0.7 cm   T2 appearance: Circumscribed, homogenous hypointense focus/mass less than 1.5 cm.   Diffusion-Weighted Imaging: Focal markedly hypointense on ADC and markedly hyperintense on DWI.   Enhancement: Positive.  Prostate margin: No involvement.    Diffusion abnormality meets PI-RADS 4.   Region of interest created for biopsy and/or follow-up.     TRANSITIONAL ZONE: BPH. No suspicious foci.  Lesion 1.      No seminal vesicle invasion.  No pelvic lymphadenopathy.  No lesions in the visualized bones.     PROSTATE GLAND VOLUME: 52 cc                                               IMPRESSION:  1.  Lesion 1 in the left mid gland posterior peripheral zone is assigned PI-RADS CATEGORY 4: High. Clinically significant cancer is likely to be  present.    ASSESSMENT and PLAN  76-year-old man with history of bilateral nephrolithiasis and BPH and Elevated PSA. Now with PIRADs 4 lesion on MRI, s/p UroNav biopsy with Margaret 3+4=7 prostate cancer with positive cores from 3/3 target and 7/12 template cores.    DISCUSSION PROSTATE CANCER     The natural history of this disease was explained to the patient at length and the treatment options discussed including radical prostatectomy by open or robotic-assisted laparoscopic approach, external-beam radiotherapy, brachytherapy, and watchful waiting, including the probability of success and complications associated with these approaches.    With respect to watchful waiting, we discussed the difficulties of estimating the extent of disease preoperatively, the risk of cancer progression, and risk that salvage might not be possible with progression. However, the favorable 10-year outcomes of active surveillance in appropriately selected patients was conveyed.  We discussed that active surveillance is the treatment of choice for men with Margaret 6 prostate cancer in select patients with Margaret 3+4 = 7 prostate cancer.  I think he would be a reasonable candidate for active surveillance, though we would need to confirm this with a decipher score and he would like to have this sent off.    With respect to seed implants, we discussed risks including but not limited to cancer recurrence, exacerbation of voiding symptoms or hematuria, urinary retention, induction of 2nd malignancy, and risks of rectal symptoms or bleeding.  We also discussed risks of the anesthesia including but not limited to MI, CVA, DVT, and PE.      With respect to EBRT, we discussed we discussed risks including but not limited to cancer recurrence, exacerbation of voiding symptoms or hematuria, urinary retention, incontinence, stricture of the urinary tract, induction of 2nd malignancy, and risks of rectal symptoms or bleeding.  We discussed fact that  rectal symptoms may be more common with EBRT than with other treatment modalities.     With radiation therapy, we discussed the difficulties in diagnosing recurrent disease at an early stage due to variability in PSA levels and the fact that most patients are not candidates for local salvage therapy when biochemical recurrence is declared.  We also discussed the significant morbidity of local salvage therapy in terms of perioperative complications, erectile dysfunction and urinary incontinence.    With respect to radical prostatectomy, the pros and cons of open vs robotic-assisted laparoscopic prostatectomy were discussed. The complications of this procedure were reviewed with the patient and include (but not limited to) urinary incontinence, erectile dysfunction, infertility, anastomotic stricture, lymphocele, hemorrhage requiring transfusion, rectal, ureteral, or nerve injury, infection, cardiovascular, pulmonary, thromboembolic, and anesthetic complications.  For robotic prostatectomy, the additional complications of anastomotic urine leak and small bowel obstruction from adhesions was conveyed. The anticipated post-operative course was explained, including an anticipated 1 night hospital stay.    With surgery, we also discussed the potential advantage over radiation therapy in that biochemical recurrence can be detected at a relatively earlier stage and that salvage radiotherapy is successful in controlling recurrent disease in a substantial proportion of patients.  I also conveyed that salvage radiotherapy was associated with a considerably more favorable morbidity profile compared to local salvage therapies for radiorecurent disease.  We discussed that as patients age we then tend to favor radiation over surgery and I think that he would be an excellent radiation candidate to go surgery would not be out of the question.    All questions were answered.     Plan:  - decipher  - Rad Onc  - He will call  [place]    Bilateral nephrolithiasis  -He follows with Dr. Montero in nephrology and is on potassium citrate  -I reviewed his KUB images personally and agree with the radiologist interpretation    BPH with mild LUTS  -He is doing very well on Uroxatrol 10 mg daily  -Prescription for this was refilled      Time spent: 25 minutes spent on the date of the encounter doing chart review, history and exam, documentation and further activities as noted above.  Telephone time: 21 minutes    Hosea Amaya MD   Urology  [facility]  [facility] Phone: [phone]  [facility] Phone: [phone]      Carroll is a 76 year old who is being evaluated via a billable video visit.      How would you like to obtain your AVS? MyChart  If the video visit is dropped, the invitation should be resent by: Text to cell phone: [phone]  Will anyone else be joining your video visit? No        Video-Visit Details -converted to telephone only due to patient connection issues

## 2022-09-14 ENCOUNTER — VIRTUAL VISIT (OUTPATIENT)
Dept: UROLOGY | Facility: CLINIC | Age: 77
End: 2022-09-14
Payer: MEDICARE

## 2022-09-14 VITALS — WEIGHT: 205 LBS | HEIGHT: 68 IN | BODY MASS INDEX: 31.07 KG/M2

## 2022-09-14 DIAGNOSIS — N20.0 BILATERAL NEPHROLITHIASIS: ICD-10-CM

## 2022-09-14 DIAGNOSIS — C61 PROSTATE CANCER (H): Primary | ICD-10-CM

## 2022-09-14 DIAGNOSIS — N13.8 BPH WITH OBSTRUCTION/LOWER URINARY TRACT SYMPTOMS: ICD-10-CM

## 2022-09-14 DIAGNOSIS — N40.1 BPH WITH OBSTRUCTION/LOWER URINARY TRACT SYMPTOMS: ICD-10-CM

## 2022-09-14 PROCEDURE — 99443 PR PHYSICIAN TELEPHONE EVALUATION 21-30 MIN: CPT | Performed by: UROLOGY

## 2022-09-14 ASSESSMENT — PAIN SCALES - GENERAL: PAINLEVEL: NO PAIN (0)

## 2022-09-14 NOTE — LETTER
"9/14/2022       RE: Carroll Riggins  [address] [facility] [address]     Dear Colleague,    Thank you for referring your patient, Carroll Riggins, to the [facility] UROLOGY CLINIC [place] at [facility]. Please see a copy of my visit note below.    SOUTHDAFEDE  CHIEF COMPLAINT   It was my pleasure to see Carroll Riggins who is a 77 year old male for follow-up of nephrolithiasis and LUTS.      HPI   Carroll Riggins is a very pleasant 77 year old male who presents with a history of nephrolithiasis and LUTS    Prior Dr. 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  Medications essentially are the same except he is now on Lyrica  His KUB shows 2 mm stones bilaterally- 3 or 4 in the left kidney and one in the lower pole of the right kidney.  Upper right kidney is obscured by stool.  In the last year he has had no flank pain or passes stone.  He denies any dysuria or hematuria and has a normal urinalysis.  Specific gravity is 1.015.  Postvoid residual is 150 cc- he voided for the specimen, not to empty his bladder  He complains that Uroxatrol taken in the morning causes him urinary frequency while he is playing golf-he has to void every 3 holes.  He also drinks much more water than he used to and usually has a diet Coke in the morning.\"    6/18/21:  He does follow with Dr. Montero in nephrology  Overall he is doing well with minimal symptoms   no issues with kidney stones for the last year  Overall he is urinating well with only some mild frequency when he drinks a pop    AUASS: 9-2-1-4-0-0-0 = 10  QOL = 2  PVR = 77    7/29/2022:  He is overall doing well with minimal symptoms  He does have some bothersome urgency and frequency, " "though this is mainly related to after he takes his morning diuretic    AUASS: 9-0-6-5-0-0-0 = 10  QOL = 3    8/17/2022:  No issues from the MRI  Follow up today to discuss results     9/7/2022:  Follow-up today to discuss his biopsy results  He did well after the biopsy with no issues    TODAY 9/14/2022:  He is still debating his treatment options  He has talk to [facility] and they should be calling him tomorrow to potentially schedule proton beam radiation therapy  He is planning to go to [place] for the winter in October    PHYSICAL EXAM  Patient is a 77 year old  male   Vitals: Height 1.715 m (5' 7.5\"), weight 93 kg (205 lb).  Body mass index is 31.63 kg/m .  General: No evidence of distress   Lungs: normal respiratory effort  Neuro: Alert, oriented, speech and mentation normal  Psych: affect and mood normal     PSA:  7/29/2022  6.20  6/29/2021 5.17  7/25/2018 3.36    STONE ANALYSIS:  Calculi composed primarily of:   60% calcium oxalate monohydrate,   10% calcium oxalate dihydrate, and   30% calcium phosphate (hydroxy- and carbonate- apatite).     PATHOLOGY:  8/29/2022:  Final Diagnosis   A.  Prostate, left lateral base, biopsy-  Adenocarcinoma, [place]'s grade 3/3 with associated perineural invasion in 1 of 1 needle core biopsy and 20% of submitted tissue    B.  Prostate, left base, biopsy-  Adenocarcinoma, Margaret's grade 3/4 in 1 of 1 needle core biopsy and 15% of submitted tissue    C.  Prostate, left lateral mid, biopsy-  Adenocarcinoma, [place]'s grade 3/3 with perineural invasion involving 1 of 1 needle core biopsy and 1% of submitted tissue    D.  Prostate, left mid, biopsy-  Adenocarcinoma, [place]'s grade 3/3 with perineural invasion involving 1 of 1 needle core biopsy and 40% of submitted tissue    E.  Prostate, left lateral apex, biopsy-  Benign prostate tissue    F.  Prostate, left apex, biopsy-  Adenocarcinoma, [place]'s grade 3/3 with perineural invasion involving 1 of 1 needle core biopsy " and 25% of submitted tissue    G.  Prostate, right lateral base, biopsy-  Adenocarcinoma, Margaret grade 3/3 in 1 of 1 needle core biopsy and 5% of submitted tissue    H.  Prostate, right base, biopsy-  Benign prostate tissue    I.  Prostate, right lateral mid, biopsy-  Atypical small gland proliferation    J.  Prostate, right mid, biopsy-  Benign prostate tissue    K.  Prostate, right lateral apex, biopsy-  Benign prostate tissue    L.  Prostate, right apex, biopsy-  Adenocarcinoma, [place]'s grade 3/3 in 2 of 2 needle core biopsies and 40% of submitted tissue    M.  Prostate, left, lesion, biopsy-  Adenocarcinoma, [place]'s grade 3/4 in 3 of 3 needle core biopsies and 50% of submitted tissue       IMAGING:  All pertinent imaging reviewed:    All imaging studies reviewed by me.  I personally reviewed these imaging films.  A formal report from radiology will follow.    KUB 6/16/21:  IMPRESSION: A few tiny stones approximately 0.2 cm in size are noted  within the bilateral kidneys. Elongated calcification at the right  upper abdomen may be cholelithiasis and is again noted. Nonobstructive  bowel. Stable pelvic phleboliths.    KUB 7/22/22:  IMPRESSION: 2 mm left renal calculus is unchanged. Possible 2 mm left  renal calculus. Pelvic phleboliths. Findings similar to previous.     MRI PROSTATE 8/11/2022:  FINDINGS:  PERIPHERAL ZONE:   Lesion 1.   Left mid gland posterior peripheral zone measuring 1.2 x 0.7 x 0.7 cm   T2 appearance: Circumscribed, homogenous hypointense focus/mass less than 1.5 cm.   Diffusion-Weighted Imaging: Focal markedly hypointense on ADC and markedly hyperintense on DWI.   Enhancement: Positive.  Prostate margin: No involvement.    Diffusion abnormality meets PI-RADS 4.   Region of interest created for biopsy and/or follow-up.     TRANSITIONAL ZONE: BPH. No suspicious foci.  Lesion 1.      No seminal vesicle invasion.  No pelvic lymphadenopathy.  No lesions in the visualized bones.     PROSTATE  GLAND VOLUME: 52 cc                                               IMPRESSION:  1.  Lesion 1 in the left mid gland posterior peripheral zone is assigned PI-RADS CATEGORY 4: High. 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    ASSESSMENT and PLAN  77-year-old man with history of bilateral nephrolithiasis and BPH and Elevated PSA. Now with PIRADs 4 lesion on MRI, s/p UroNav biopsy with Margaret 3+4=7 prostate cancer with positive cores from 3/3 target and 7/12 template cores.    DISCUSSION PROSTATE CANCER     The natural history of this disease was explained to the patient at length and the treatment options discussed including radical prostatectomy by open or robotic-assisted laparoscopic approach, external-beam radiotherapy, brachytherapy, and watchful waiting, including the probability of success and complications associated with these approaches.    With respect to watchful waiting, we discussed the difficulties of estimating the extent of disease preoperatively, the risk of cancer progression, and risk that salvage might not be possible with progression. However, the favorable 10-year outcomes of active surveillance in appropriately selected patients was conveyed.  We discussed that active surveillance is the treatment of choice for men with Widen 6 prostate cancer in select patients with Widen 3+4 = 7 prostate cancer.  I think he would be a reasonable candidate for active surveillance, though we would need to confirm this with a decipher score and he would like to have this sent off.  We discussed that this decipher score still pending and I will send him these results on [place]    With respect to seed implants, we discussed risks including but not limited to cancer recurrence, exacerbation of voiding symptoms or hematuria, urinary retention, induction of 2nd malignancy, and risks of rectal symptoms or bleeding.  We also discussed risks of the anesthesia including but not limited to MI, CVA, DVT,  and PE.      With respect to EBRT, we discussed we discussed risks including but not limited to cancer recurrence, exacerbation of voiding symptoms or hematuria, urinary retention, incontinence, stricture of the urinary tract, induction of 2nd malignancy, and risks of rectal symptoms or bleeding.  We discussed fact that rectal symptoms may be more common with EBRT than with other treatment modalities.     With radiation therapy, we discussed the difficulties in diagnosing recurrent disease at an early stage due to variability in PSA levels and the fact that most patients are not candidates for local salvage therapy when biochemical recurrence is declared.  We also discussed the significant morbidity of local salvage therapy in terms of perioperative complications, erectile dysfunction and urinary incontinence.    With respect to radical prostatectomy, the pros and cons of open vs robotic-assisted laparoscopic prostatectomy were discussed. The complications of this procedure were reviewed with the patient and include (but not limited to) urinary incontinence, erectile dysfunction, infertility, anastomotic stricture, lymphocele, hemorrhage requiring transfusion, rectal, ureteral, or nerve injury, infection, cardiovascular, pulmonary, thromboembolic, and anesthetic complications.  For robotic prostatectomy, the additional complications of anastomotic urine leak and small bowel obstruction from adhesions was conveyed. The anticipated post-operative course was explained, including an anticipated 1 night hospital stay.    With surgery, we also discussed the potential advantage over radiation therapy in that biochemical recurrence can be detected at a relatively earlier stage and that salvage radiotherapy is successful in controlling recurrent disease in a substantial proportion of patients.  I also conveyed that salvage radiotherapy was associated with a considerably more favorable morbidity profile compared to local  salvage therapies for radiorecurent disease.  We discussed that as patients age we then tend to favor radiation over surgery and I think that he would be an excellent radiation candidate to go surgery would not be out of the question.    All questions were answered.     Plan:  - decipher score pending and I will send the results on MyChart  - He is considering proton beam radiation therapy at [place]    Bilateral nephrolithiasis  -He follows with Dr. Montero in nephrology and is on potassium citrate  -I reviewed his KUB images personally and agree with the radiologist interpretation    BPH with mild LUTS  -He is doing very well on Uroxatrol 10 mg daily  -Prescription for this was refilled previously      Time spent: 25 minutes spent on the date of the encounter doing chart review, history and exam, documentation and further activities as noted above.  Telephone time: 11 minutes    Hosea Amaya MD   Urology  [facility]  [facility] Phone: [phone]  [facility] Phone: [phone]      Carroll is a 77 year old who is being evaluated via a billable telephone visit.      What phone number would you like to be contacted at?   [phone]  How would you like to obtain your AVS? [place]

## 2022-09-14 NOTE — PROGRESS NOTES
"[facility]  CHIEF COMPLAINT   It was my pleasure to see Carroll Riggins who is a 77 year old male for follow-up of nephrolithiasis and LUTS.      HPI   Carroll Riggins is a very pleasant 77 year old male who presents with a history of nephrolithiasis and LUTS    Prior Dr. 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  Medications essentially are the same except he is now on Lyrica  His KUB shows 2 mm stones bilaterally- 3 or 4 in the left kidney and one in the lower pole of the right kidney.  Upper right kidney is obscured by stool.  In the last year he has had no flank pain or passes stone.  He denies any dysuria or hematuria and has a normal urinalysis.  Specific gravity is 1.015.  Postvoid residual is 150 cc- he voided for the specimen, not to empty his bladder  He complains that Uroxatrol taken in the morning causes him urinary frequency while he is playing golf-he has to void every 3 holes.  He also drinks much more water than he used to and usually has a diet Coke in the morning.\"    6/18/21:  He does follow with Dr. Montero in nephrology  Overall he is doing well with minimal symptoms   no issues with kidney stones for the last year  Overall he is urinating well with only some mild frequency when he drinks a pop    AUASS: 6-1-5-4-0-0-0 = 10  QOL = 2  PVR = 77    7/29/2022:  He is overall doing well with minimal symptoms  He does have some bothersome urgency and frequency, though this is mainly related to after he takes his morning diuretic    AUASS: 2-0-4-5-0-0-0 = 10  QOL = 3    8/17/2022:  No issues from the MRI  Follow up today to discuss results     9/7/2022:  Follow-up today to discuss his biopsy results  He did well after the biopsy with no issues    TODAY 9/14/2022:  He is still " "debating his treatment options  He has talk to [facility] and they should be calling him tomorrow to potentially schedule proton beam radiation therapy  He is planning to go to [place] for the winter in October    PHYSICAL EXAM  Patient is a 77 year old  male   Vitals: Height 1.715 m (5' 7.5\"), weight 93 kg (205 lb).  Body mass index is 31.63 kg/m .  General: No evidence of distress   Lungs: normal respiratory effort  Neuro: Alert, oriented, speech and mentation normal  Psych: affect and mood normal     PSA:  7/29/2022  6.20  6/29/2021 5.17  7/25/2018 3.36    STONE ANALYSIS:  Calculi composed primarily of:   60% calcium oxalate monohydrate,   10% calcium oxalate dihydrate, and   30% calcium phosphate (hydroxy- and carbonate- apatite).     PATHOLOGY:  8/29/2022:  Final Diagnosis   A.  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  Prostate, left base, biopsy-  Adenocarcinoma, [place]'s grade 3/4 in 1 of 1 needle core biopsy and 15% of submitted tissue    C.  Prostate, left lateral mid, biopsy-  Adenocarcinoma, [place]'s grade 3/3 with perineural invasion involving 1 of 1 needle core biopsy and 1% of submitted tissue    D.  Prostate, left mid, biopsy-  Adenocarcinoma, [place]'s grade 3/3 with perineural invasion involving 1 of 1 needle core biopsy and 40% of submitted tissue    E.  Prostate, left lateral apex, biopsy-  Benign prostate tissue    F.  Prostate, left apex, biopsy-  Adenocarcinoma, Margaret's grade 3/3 with perineural invasion involving 1 of 1 needle core biopsy and 25% of submitted tissue    G.  Prostate, right lateral base, biopsy-  Adenocarcinoma, [place] grade 3/3 in 1 of 1 needle core biopsy and 5% of submitted tissue    H.  Prostate, right base, biopsy-  Benign prostate tissue    I.  Prostate, right lateral mid, biopsy-  Atypical small gland proliferation    J.  Prostate, " right mid, biopsy-  Benign prostate tissue    K.  Prostate, right lateral apex, biopsy-  Benign prostate tissue    L.  Prostate, right apex, biopsy-  Adenocarcinoma, [place]'s grade 3/3 in 2 of 2 needle core biopsies and 40% of submitted tissue    M.  Prostate, left, lesion, biopsy-  Adenocarcinoma, [place]'s grade 3/4 in 3 of 3 needle core biopsies and 50% of submitted tissue       IMAGING:  All pertinent imaging reviewed:    All imaging studies reviewed by me.  I personally reviewed these imaging films.  A formal report from radiology will follow.    KUB 6/16/21:  IMPRESSION: A few tiny stones approximately 0.2 cm in size are noted  within the bilateral kidneys. Elongated calcification at the right  upper abdomen may be cholelithiasis and is again noted. Nonobstructive  bowel. Stable pelvic phleboliths.    KUB 7/22/22:  IMPRESSION: 2 mm left renal calculus is unchanged. Possible 2 mm left  renal calculus. Pelvic phleboliths. Findings similar to previous.     MRI PROSTATE 8/11/2022:  FINDINGS:  PERIPHERAL ZONE:   Lesion 1.   Left mid gland posterior peripheral zone measuring 1.2 x 0.7 x 0.7 cm   T2 appearance: Circumscribed, homogenous hypointense focus/mass less than 1.5 cm.   Diffusion-Weighted Imaging: Focal markedly hypointense on ADC and markedly hyperintense on DWI.   Enhancement: Positive.  Prostate margin: No involvement.    Diffusion abnormality meets PI-RADS 4.   Region of interest created for biopsy and/or follow-up.     TRANSITIONAL ZONE: BPH. No suspicious foci.  Lesion 1.      No seminal vesicle invasion.  No pelvic lymphadenopathy.  No lesions in the visualized bones.     PROSTATE GLAND VOLUME: 52 cc                                               IMPRESSION:  1.  Lesion 1 in the left mid gland posterior peripheral zone is assigned PI-RADS CATEGORY 4: High. 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    ASSESSMENT and PLAN  77-year-old man with history of bilateral nephrolithiasis and BPH  and Elevated PSA. Now with PIRADs 4 lesion on MRI, s/p UroNav biopsy with [place] 3+4=7 prostate cancer with positive cores from 3/3 target and 7/12 template cores.    DISCUSSION PROSTATE CANCER     The natural history of this disease was explained to the patient at length and the treatment options discussed including radical prostatectomy by open or robotic-assisted laparoscopic approach, external-beam radiotherapy, brachytherapy, and watchful waiting, including the probability of success and complications associated with these approaches.    With respect to watchful waiting, we discussed the difficulties of estimating the extent of disease preoperatively, the risk of cancer progression, and risk that salvage might not be possible with progression. However, the favorable 10-year outcomes of active surveillance in appropriately selected patients was conveyed.  We discussed that active surveillance is the treatment of choice for men with Margaret 6 prostate cancer in select patients with [place] 3+4 = 7 prostate cancer.  I think he would be a reasonable candidate for active surveillance, though we would need to confirm this with a decipher score and he would like to have this sent off.  We discussed that this decipher score still pending and I will send him these results on [facility]    With respect to seed implants, we discussed risks including but not limited to cancer recurrence, exacerbation of voiding symptoms or hematuria, urinary retention, induction of 2nd malignancy, and risks of rectal symptoms or bleeding.  We also discussed risks of the anesthesia including but not limited to MI, CVA, DVT, and PE.      With respect to EBRT, we discussed we discussed risks including but not limited to cancer recurrence, exacerbation of voiding symptoms or hematuria, urinary retention, incontinence, stricture of the urinary tract, induction of 2nd malignancy, and risks of rectal symptoms or bleeding.  We discussed fact that  rectal symptoms may be more common with EBRT than with other treatment modalities.     With radiation therapy, we discussed the difficulties in diagnosing recurrent disease at an early stage due to variability in PSA levels and the fact that most patients are not candidates for local salvage therapy when biochemical recurrence is declared.  We also discussed the significant morbidity of local salvage therapy in terms of perioperative complications, erectile dysfunction and urinary incontinence.    With respect to radical prostatectomy, the pros and cons of open vs robotic-assisted laparoscopic prostatectomy were discussed. The complications of this procedure were reviewed with the patient and include (but not limited to) urinary incontinence, erectile dysfunction, infertility, anastomotic stricture, lymphocele, hemorrhage requiring transfusion, rectal, ureteral, or nerve injury, infection, cardiovascular, pulmonary, thromboembolic, and anesthetic complications.  For robotic prostatectomy, the additional complications of anastomotic urine leak and small bowel obstruction from adhesions was conveyed. The anticipated post-operative course was explained, including an anticipated 1 night hospital stay.    With surgery, we also discussed the potential advantage over radiation therapy in that biochemical recurrence can be detected at a relatively earlier stage and that salvage radiotherapy is successful in controlling recurrent disease in a substantial proportion of patients.  I also conveyed that salvage radiotherapy was associated with a considerably more favorable morbidity profile compared to local salvage therapies for radiorecurent disease.  We discussed that as patients age we then tend to favor radiation over surgery and I think that he would be an excellent radiation candidate to go surgery would not be out of the question.    All questions were answered.     Plan:  - decipher score pending and I will send the  results on MyChart  - He is considering proton beam radiation therapy at [facility]    Bilateral nephrolithiasis  -He follows with Dr. Montero in nephrology and is on potassium citrate  -I reviewed his KUB images personally and agree with the radiologist interpretation    BPH with mild LUTS  -He is doing very well on Uroxatrol 10 mg daily  -Prescription for this was refilled previously      Time spent: 25 minutes spent on the date of the encounter doing chart review, history and exam, documentation and further activities as noted above.  Telephone time: 11 minutes    Hosea Amaya MD   Urology  [facility]  [facility] Phone: [phone]  [facility] Phone: [phone]      Carroll is a 77 year old who is being evaluated via a billable telephone visit.      What phone number would you like to be contacted at?   [phone]  How would you like to obtain your AVS? MyChart

## 2022-10-10 LAB — SCANNED LAB RESULT: NORMAL

## 2022-10-12 ENCOUNTER — VIRTUAL VISIT (OUTPATIENT)
Dept: UROLOGY | Facility: CLINIC | Age: 77
End: 2022-10-12
Payer: MEDICARE

## 2022-10-12 DIAGNOSIS — C61 PROSTATE CANCER (H): Primary | ICD-10-CM

## 2022-10-12 DIAGNOSIS — R97.20 ELEVATED PROSTATE SPECIFIC ANTIGEN (PSA): ICD-10-CM

## 2022-10-12 PROCEDURE — 99214 OFFICE O/P EST MOD 30 MIN: CPT | Mod: 95 | Performed by: UROLOGY

## 2022-10-12 RX ORDER — BICALUTAMIDE 50 MG/1
50 TABLET, FILM COATED ORAL DAILY
Qty: 14 TABLET | Refills: 0 | Status: SHIPPED | OUTPATIENT
Start: 2022-10-12 | End: 2022-10-26

## 2022-10-12 NOTE — LETTER
"10/12/2022       RE: Carroll Riggins  [address] [id number]     Dear Colleague,    Thank you for referring your patient, Carroll Riggins, to the [facility] [place] at [facility]. Please see a copy of my visit note below.    [facility]  CHIEF COMPLAINT   It was my pleasure to see Carroll Riggins who is a 77 year old male for follow-up of nephrolithiasis and LUTS.      HPI   Carroll Riggins is a very pleasant 77 year old male who presents with a history of nephrolithiasis and LUTS    Prior Dr. 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  Medications essentially are the same except he is now on Lyrica  His KUB shows 2 mm stones bilaterally- 3 or 4 in the left kidney and one in the lower pole of the right kidney.  Upper right kidney is obscured by stool.  In the last year he has had no flank pain or passes stone.  He denies any dysuria or hematuria and has a normal urinalysis.  Specific gravity is 1.015.  Postvoid residual is 150 cc- he voided for the specimen, not to empty his bladder  He complains that Uroxatrol taken in the morning causes him urinary frequency while he is playing golf-he has to void every 3 holes.  He also drinks much more water than he used to and usually has a diet Coke in the morning.\"    6/18/21:  He does follow with Dr. Montero in nephrology  Overall he is doing well with minimal symptoms   no issues with kidney stones for the last year  Overall he is urinating well with only some mild frequency when he drinks a pop    AUASS: 8-4-5-4-0-0-0 = 10  QOL = 2  PVR = 77    7/29/2022:  He is overall doing well with minimal symptoms  He does have some bothersome urgency and frequency, " though this is mainly related to after he takes his morning diuretic    AUASS: 0-1-4-5-0-0-0 = 10  QOL = 3    8/17/2022:  No issues from the MRI  Follow up today to discuss results     9/7/2022:  Follow-up today to discuss his biopsy results  He did well after the biopsy with no issues    9/14/2022:  He is still debating his treatment options  He has talk to [facility] and they should be calling him tomorrow to potentially schedule proton beam radiation therapy  He is planning to go to [place] for the winter in October    TODAY 10/12/2022:  Just getting over COVID  Follow-up today to discuss his decipher score  He did meet with [facility] radiation oncology with tentative plans to start proton beam radiation therapy    PHYSICAL EXAM  Patient is a 77 year old  male   Vitals: There were no vitals taken for this visit.  There is no height or weight on file to calculate BMI.  General: No evidence of distress   Lungs: normal respiratory effort  Neuro: Alert, oriented, speech and mentation normal  Psych: affect and mood normal     PSA:  7/29/2022  6.20  6/29/2021 5.17  7/25/2018 3.36    STONE ANALYSIS:  Calculi composed primarily of:   60% calcium oxalate monohydrate,   10% calcium oxalate dihydrate, and   30% calcium phosphate (hydroxy- and carbonate- apatite).     PATHOLOGY:  8/29/2022:  Final Diagnosis   A.  Prostate, left lateral base, biopsy-  Adenocarcinoma, [place]'s grade 3/3 with associated perineural invasion in 1 of 1 needle core biopsy and 20% of submitted tissue    B.  Prostate, left base, biopsy-  Adenocarcinoma, Margaret's grade 3/4 in 1 of 1 needle core biopsy and 15% of submitted tissue    C.  Prostate, left lateral mid, biopsy-  Adenocarcinoma, [place]'s grade 3/3 with perineural invasion involving 1 of 1 needle core biopsy and 1% of submitted tissue    D.  Prostate, left mid, biopsy-  Adenocarcinoma, [place]'s grade 3/3 with perineural invasion involving 1 of 1 needle core biopsy and 40% of submitted  tissue    E.  Prostate, left lateral apex, biopsy-  Benign prostate tissue    F.  Prostate, left apex, biopsy-  Adenocarcinoma, Margaret's grade 3/3 with perineural invasion involving 1 of 1 needle core biopsy and 25% of submitted tissue    G.  Prostate, right lateral base, biopsy-  Adenocarcinoma, [place] grade 3/3 in 1 of 1 needle core biopsy and 5% of submitted tissue    H.  Prostate, right base, biopsy-  Benign prostate tissue    I.  Prostate, right lateral mid, biopsy-  Atypical small gland proliferation    J.  Prostate, right mid, biopsy-  Benign prostate tissue    K.  Prostate, right lateral apex, biopsy-  Benign prostate tissue    L.  Prostate, right apex, biopsy-  Adenocarcinoma, [place]'s grade 3/3 in 2 of 2 needle core biopsies and 40% of submitted tissue    M.  Prostate, left, lesion, biopsy-  Adenocarcinoma, Margaret's grade 3/4 in 3 of 3 needle core biopsies and 50% of submitted tissue     DECIPHER SCORE: 0.45  Risk of mets with treatment at 5 years - 1.7%  Risk of mets with treatment at 10 years - 4.4%  Risk of adverse pathology - 22.1%      IMAGING:  All pertinent imaging reviewed:    All imaging studies reviewed by me.  I personally reviewed these imaging films.  A formal report from radiology will follow.    KUB 6/16/21:  IMPRESSION: A few tiny stones approximately 0.2 cm in size are noted  within the bilateral kidneys. Elongated calcification at the right  upper abdomen may be cholelithiasis and is again noted. Nonobstructive  bowel. Stable pelvic phleboliths.    KUB 7/22/22:  IMPRESSION: 2 mm left renal calculus is unchanged. Possible 2 mm left  renal calculus. Pelvic phleboliths. Findings similar to previous.     MRI PROSTATE 8/11/2022:  FINDINGS:  PERIPHERAL ZONE:   Lesion 1.   Left mid gland posterior peripheral zone measuring 1.2 x 0.7 x 0.7 cm   T2 appearance: Circumscribed, homogenous hypointense focus/mass less than 1.5 cm.   Diffusion-Weighted Imaging: Focal markedly hypointense on ADC and  markedly hyperintense on DWI.   Enhancement: Positive.  Prostate margin: No involvement.    Diffusion abnormality meets PI-RADS 4.   Region of interest created for biopsy and/or follow-up.     TRANSITIONAL ZONE: BPH. No suspicious foci.  Lesion 1.      No seminal vesicle invasion.  No pelvic lymphadenopathy.  No lesions in the visualized bones.     PROSTATE GLAND VOLUME: 52 cc                                               IMPRESSION:  1.  Lesion 1 in the left mid gland posterior peripheral zone is assigned PI-RADS CATEGORY 4: High. 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    ASSESSMENT and PLAN  77-year-old man with history of bilateral nephrolithiasis and BPH and Elevated PSA. Now with PIRADs 4 lesion on MRI, s/p UroNav biopsy with [place] 3+4=7 prostate cancer with positive cores from 3/3 target and 7/12 template cores.    DISCUSSION PROSTATE CANCER     The natural history of this disease was explained to the patient at length and the treatment options discussed including radical prostatectomy by open or robotic-assisted laparoscopic approach, external-beam radiotherapy, brachytherapy, and watchful waiting, including the probability of success and complications associated with these approaches.    With respect to watchful waiting, we discussed the difficulties of estimating the extent of disease preoperatively, the risk of cancer progression, and risk that salvage might not be possible with progression. However, the favorable 10-year outcomes of active surveillance in appropriately selected patients was conveyed.  We discussed that active surveillance is the treatment of choice for men with Margaret 6 prostate cancer in select patients with [place] 3+4 = 7 prostate cancer.      We discussed that his decipher score places him in the intermediate risk category and that this does not necessarily push us in 1 direction or the other.  He did meet with [facility] radiation oncology who strongly recommended  proceeding with radiation therapy with adjuvant ADT.    With respect to seed implants, we discussed risks including but not limited to cancer recurrence, exacerbation of voiding symptoms or hematuria, urinary retention, induction of 2nd malignancy, and risks of rectal symptoms or bleeding.  We also discussed risks of the anesthesia including but not limited to MI, CVA, DVT, and PE.      With respect to EBRT, we discussed we discussed risks including but not limited to cancer recurrence, exacerbation of voiding symptoms or hematuria, urinary retention, incontinence, stricture of the urinary tract, induction of 2nd malignancy, and risks of rectal symptoms or bleeding.  We discussed fact that rectal symptoms may be more common with EBRT than with other treatment modalities.     With radiation therapy, we discussed the difficulties in diagnosing recurrent disease at an early stage due to variability in PSA levels and the fact that most patients are not candidates for local salvage therapy when biochemical recurrence is declared.  We also discussed the significant morbidity of local salvage therapy in terms of perioperative complications, erectile dysfunction and urinary incontinence.    With respect to radical prostatectomy, the pros and cons of open vs robotic-assisted laparoscopic prostatectomy were discussed. The complications of this procedure were reviewed with the patient and include (but not limited to) urinary incontinence, erectile dysfunction, infertility, anastomotic stricture, lymphocele, hemorrhage requiring transfusion, rectal, ureteral, or nerve injury, infection, cardiovascular, pulmonary, thromboembolic, and anesthetic complications.  For robotic prostatectomy, the additional complications of anastomotic urine leak and small bowel obstruction from adhesions was conveyed. The anticipated post-operative course was explained, including an anticipated 1 night hospital stay.    With surgery, we also  discussed the potential advantage over radiation therapy in that biochemical recurrence can be detected at a relatively earlier stage and that salvage radiotherapy is successful in controlling recurrent disease in a substantial proportion of patients.  I also conveyed that salvage radiotherapy was associated with a considerably more favorable morbidity profile compared to local salvage therapies for radiorecurent disease.  We discussed that as patients age we then tend to favor radiation over surgery and I think that he would be an excellent radiation candidate to go surgery would not be out of the question.    All questions were answered.     Plan:  - Order for ADT placed  - Order for Casodex placed  - He will discuss with [facility] radiation oncology regarding timing    Bilateral nephrolithiasis  -He follows with Dr. Montero in nephrology and is on potassium citrate  -I reviewed his KUB images personally and agree with the radiologist interpretation    BPH with mild LUTS  -He is doing very well on Uroxatrol 10 mg daily  -Prescription for this was refilled previously      Time spent: 25 minutes spent on the date of the encounter doing chart review, history and exam, documentation and further activities as noted above.  Telephone time: 11 minutes    Hosea Amaya MD   Urology  [facility]  [facility] Phone: [phone]  [facility] Phone: [phone]      PT WILL MEET YOU CHART  PT IS DRIVING HOME AND IF HE MAY BE CLOSER TO 3PM .  HE WILL LOG INTO morphCARD ONCE HE GETS HOME AND MEET YOU IN morphCARD    Carroll is a 77 year old who is being evaluated via a billable video visit.      How would you like to obtain your AVS? [facility]  If the video visit is dropped, the invitation should be resent by: Text to cell phone: [phone]  Will anyone else be joining your video visit? No        Video-Visit Details - telephone only due to patient connection  issues

## 2022-10-12 NOTE — PROGRESS NOTES
"[facility]  CHIEF COMPLAINT   It was my pleasure to see Carroll Riggins who is a 77 year old male for follow-up of nephrolithiasis and LUTS.      HPI   Carroll Riggins is a very pleasant 77 year old male who presents with a history of nephrolithiasis and LUTS    Prior Dr. 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  Medications essentially are the same except he is now on Lyrica  His KUB shows 2 mm stones bilaterally- 3 or 4 in the left kidney and one in the lower pole of the right kidney.  Upper right kidney is obscured by stool.  In the last year he has had no flank pain or passes stone.  He denies any dysuria or hematuria and has a normal urinalysis.  Specific gravity is 1.015.  Postvoid residual is 150 cc- he voided for the specimen, not to empty his bladder  He complains that Uroxatrol taken in the morning causes him urinary frequency while he is playing golf-he has to void every 3 holes.  He also drinks much more water than he used to and usually has a diet Coke in the morning.\"    6/18/21:  He does follow with Dr. Montero in nephrology  Overall he is doing well with minimal symptoms   no issues with kidney stones for the last year  Overall he is urinating well with only some mild frequency when he drinks a pop    AUASS: 1-7-2-4-0-0-0 = 10  QOL = 2  PVR = 77    7/29/2022:  He is overall doing well with minimal symptoms  He does have some bothersome urgency and frequency, though this is mainly related to after he takes his morning diuretic    AUASS: 9-2-4-5-0-0-0 = 10  QOL = 3    8/17/2022:  No issues from the MRI  Follow up today to discuss results     9/7/2022:  Follow-up today to discuss his biopsy results  He did well after the biopsy with no issues    9/14/2022:  He is still debating his " treatment options  He has talk to [facility] and they should be calling him tomorrow to potentially schedule proton beam radiation therapy  He is planning to go to [place] for the winter in October    TODAY 10/12/2022:  Just getting over COVID  Follow-up today to discuss his decipher score  He did meet with [facility] radiation oncology with tentative plans to start proton beam radiation therapy    PHYSICAL EXAM  Patient is a 77 year old  male   Vitals: There were no vitals taken for this visit.  There is no height or weight on file to calculate BMI.  General: No evidence of distress   Lungs: normal respiratory effort  Neuro: Alert, oriented, speech and mentation normal  Psych: affect and mood normal     PSA:  7/29/2022  6.20  6/29/2021 5.17  7/25/2018 3.36    STONE ANALYSIS:  Calculi composed primarily of:   60% calcium oxalate monohydrate,   10% calcium oxalate dihydrate, and   30% calcium phosphate (hydroxy- and carbonate- apatite).     PATHOLOGY:  8/29/2022:  Final Diagnosis   A.  Prostate, left lateral base, biopsy-  Adenocarcinoma, Lena's grade 3/3 with associated perineural invasion in 1 of 1 needle core biopsy and 20% of submitted tissue    B.  Prostate, left base, biopsy-  Adenocarcinoma, Lena's grade 3/4 in 1 of 1 needle core biopsy and 15% of submitted tissue    C.  Prostate, left lateral mid, biopsy-  Adenocarcinoma, Lena's grade 3/3 with perineural invasion involving 1 of 1 needle core biopsy and 1% of submitted tissue    D.  Prostate, left mid, biopsy-  Adenocarcinoma, Margaret's grade 3/3 with perineural invasion involving 1 of 1 needle core biopsy and 40% of submitted tissue    E.  Prostate, left lateral apex, biopsy-  Benign prostate tissue    F.  Prostate, left apex, biopsy-  Adenocarcinoma, Margaret's grade 3/3 with perineural invasion involving 1 of 1 needle core biopsy and 25% of submitted tissue    G.  Prostate, right lateral base, biopsy-  Adenocarcinoma, Margaret grade 3/3 in 1 of 1  needle core biopsy and 5% of submitted tissue    H.  Prostate, right base, biopsy-  Benign prostate tissue    I.  Prostate, right lateral mid, biopsy-  Atypical small gland proliferation    J.  Prostate, right mid, biopsy-  Benign prostate tissue    K.  Prostate, right lateral apex, biopsy-  Benign prostate tissue    L.  Prostate, right apex, biopsy-  Adenocarcinoma, Margaret's grade 3/3 in 2 of 2 needle core biopsies and 40% of submitted tissue    M.  Prostate, left, lesion, biopsy-  Adenocarcinoma, Margaret's grade 3/4 in 3 of 3 needle core biopsies and 50% of submitted tissue     DECIPHER SCORE: 0.45  Risk of mets with treatment at 5 years - 1.7%  Risk of mets with treatment at 10 years - 4.4%  Risk of adverse pathology - 22.1%      IMAGING:  All pertinent imaging reviewed:    All imaging studies reviewed by me.  I personally reviewed these imaging films.  A formal report from radiology will follow.    KUB 6/16/21:  IMPRESSION: A few tiny stones approximately 0.2 cm in size are noted  within the bilateral kidneys. Elongated calcification at the right  upper abdomen may be cholelithiasis and is again noted. Nonobstructive  bowel. Stable pelvic phleboliths.    KUB 7/22/22:  IMPRESSION: 2 mm left renal calculus is unchanged. Possible 2 mm left  renal calculus. Pelvic phleboliths. Findings similar to previous.     MRI PROSTATE 8/11/2022:  FINDINGS:  PERIPHERAL ZONE:   Lesion 1.   Left mid gland posterior peripheral zone measuring 1.2 x 0.7 x 0.7 cm   T2 appearance: Circumscribed, homogenous hypointense focus/mass less than 1.5 cm.   Diffusion-Weighted Imaging: Focal markedly hypointense on ADC and markedly hyperintense on DWI.   Enhancement: Positive.  Prostate margin: No involvement.    Diffusion abnormality meets PI-RADS 4.   Region of interest created for biopsy and/or follow-up.     TRANSITIONAL ZONE: BPH. No suspicious foci.  Lesion 1.      No seminal vesicle invasion.  No pelvic lymphadenopathy.  No lesions in  the visualized bones.     PROSTATE GLAND VOLUME: 52 cc                                               IMPRESSION:  1.  Lesion 1 in the left mid gland posterior peripheral zone is assigned PI-RADS CATEGORY 4: High. 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    ASSESSMENT and PLAN  77-year-old man with history of bilateral nephrolithiasis and BPH and Elevated PSA. Now with PIRADs 4 lesion on MRI, s/p UroNav biopsy with Margaret 3+4=7 prostate cancer with positive cores from 3/3 target and 7/12 template cores.    DISCUSSION PROSTATE CANCER     The natural history of this disease was explained to the patient at length and the treatment options discussed including radical prostatectomy by open or robotic-assisted laparoscopic approach, external-beam radiotherapy, brachytherapy, and watchful waiting, including the probability of success and complications associated with these approaches.    With respect to watchful waiting, we discussed the difficulties of estimating the extent of disease preoperatively, the risk of cancer progression, and risk that salvage might not be possible with progression. However, the favorable 10-year outcomes of active surveillance in appropriately selected patients was conveyed.  We discussed that active surveillance is the treatment of choice for men with Margaret 6 prostate cancer in select patients with Rapelje 3+4 = 7 prostate cancer.      We discussed that his decipher score places him in the intermediate risk category and that this does not necessarily push us in 1 direction or the other.  He did meet with [facility] radiation oncology who strongly recommended proceeding with radiation therapy with adjuvant ADT.    With respect to seed implants, we discussed risks including but not limited to cancer recurrence, exacerbation of voiding symptoms or hematuria, urinary retention, induction of 2nd malignancy, and risks of rectal symptoms or bleeding.  We also discussed risks of the  anesthesia including but not limited to MI, CVA, DVT, and PE.      With respect to EBRT, we discussed we discussed risks including but not limited to cancer recurrence, exacerbation of voiding symptoms or hematuria, urinary retention, incontinence, stricture of the urinary tract, induction of 2nd malignancy, and risks of rectal symptoms or bleeding.  We discussed fact that rectal symptoms may be more common with EBRT than with other treatment modalities.     With radiation therapy, we discussed the difficulties in diagnosing recurrent disease at an early stage due to variability in PSA levels and the fact that most patients are not candidates for local salvage therapy when biochemical recurrence is declared.  We also discussed the significant morbidity of local salvage therapy in terms of perioperative complications, erectile dysfunction and urinary incontinence.    With respect to radical prostatectomy, the pros and cons of open vs robotic-assisted laparoscopic prostatectomy were discussed. The complications of this procedure were reviewed with the patient and include (but not limited to) urinary incontinence, erectile dysfunction, infertility, anastomotic stricture, lymphocele, hemorrhage requiring transfusion, rectal, ureteral, or nerve injury, infection, cardiovascular, pulmonary, thromboembolic, and anesthetic complications.  For robotic prostatectomy, the additional complications of anastomotic urine leak and small bowel obstruction from adhesions was conveyed. The anticipated post-operative course was explained, including an anticipated 1 night hospital stay.    With surgery, we also discussed the potential advantage over radiation therapy in that biochemical recurrence can be detected at a relatively earlier stage and that salvage radiotherapy is successful in controlling recurrent disease in a substantial proportion of patients.  I also conveyed that salvage radiotherapy was associated with a considerably  more favorable morbidity profile compared to local salvage therapies for radiorecurent disease.  We discussed that as patients age we then tend to favor radiation over surgery and I think that he would be an excellent radiation candidate to go surgery would not be out of the question.    All questions were answered.     Plan:  - Order for ADT placed  - Order for Casodex placed  - He will discuss with [facility] radiation oncology regarding timing    Bilateral nephrolithiasis  -He follows with Dr. Montero in nephrology and is on potassium citrate  -I reviewed his KUB images personally and agree with the radiologist interpretation    BPH with mild LUTS  -He is doing very well on Uroxatrol 10 mg daily  -Prescription for this was refilled previously      Time spent: 25 minutes spent on the date of the encounter doing chart review, history and exam, documentation and further activities as noted above.  Telephone time: 11 minutes    Hosea Amaya MD   Urology  [facility]  [facility] Phone: [phone]  [facility] Phone: [phone]      PT WILL MEET YOU CHART  PT IS DRIVING HOME AND IF HE MAY BE CLOSER TO 3PM .  HE WILL LOG INTO [employer] ONCE HE GETS HOME AND MEET YOU IN [employer]    Carroll is a 77 year old who is being evaluated via a billable video visit.      How would you like to obtain your AVS? O-film  If the video visit is dropped, the invitation should be resent by: Text to cell phone: [phone]  Will anyone else be joining your video visit? No        Video-Visit Details - telephone only due to patient connection issues

## 2022-10-13 ENCOUNTER — TELEPHONE (OUTPATIENT)
Dept: ONCOLOGY | Facility: CLINIC | Age: 77
End: 2022-10-13

## 2022-10-19 ENCOUNTER — ALLIED HEALTH/NURSE VISIT (OUTPATIENT)
Dept: INFUSION THERAPY | Facility: CLINIC | Age: 77
End: 2022-10-19
Attending: UROLOGY
Payer: MEDICARE

## 2022-10-19 VITALS
RESPIRATION RATE: 22 BRPM | SYSTOLIC BLOOD PRESSURE: 156 MMHG | OXYGEN SATURATION: 97 % | HEART RATE: 77 BPM | DIASTOLIC BLOOD PRESSURE: 88 MMHG | TEMPERATURE: 98.1 F

## 2022-10-19 DIAGNOSIS — C61 PROSTATE CANCER (H): Primary | ICD-10-CM

## 2022-10-19 PROCEDURE — 250N000011 HC RX IP 250 OP 636: Performed by: UROLOGY

## 2022-10-19 PROCEDURE — 96402 CHEMO HORMON ANTINEOPL SQ/IM: CPT

## 2022-10-19 RX ADMIN — LEUPROLIDE ACETATE 45 MG: KIT at 10:33

## 2022-10-19 ASSESSMENT — PAIN SCALES - GENERAL: PAINLEVEL: NO PAIN (0)

## 2022-10-19 NOTE — PROGRESS NOTES
Infusion Nursing Note:  Carroll Riggins presents today for lupron first dose.    Patient seen by provider today: No   present during visit today: Not Applicable.    Note: Reviewed lupron as this is his first injection.    Intravenous Access:  No Intravenous access/labs at this visit.    Treatment Conditions:  Not Applicable.    Post Infusion Assessment:  Patient tolerated injection without incident.  Site patent and intact, free from redness, edema or discomfort.     Discharge Plan:   AVS to patient via MYCHART.  [facility] will [facility].   Patient discharged in stable condition accompanied by: self.  Departure Mode: Ambulatory.      Satish Patiño RN

## 2022-10-22 ENCOUNTER — HEALTH MAINTENANCE LETTER (OUTPATIENT)
Age: 77
End: 2022-10-22

## 2022-11-01 ENCOUNTER — TRANSFERRED RECORDS (OUTPATIENT)
Dept: HEALTH INFORMATION MANAGEMENT | Facility: CLINIC | Age: 77
End: 2022-11-01

## 2023-01-19 ENCOUNTER — TRANSFERRED RECORDS (OUTPATIENT)
Dept: HEALTH INFORMATION MANAGEMENT | Facility: CLINIC | Age: 78
End: 2023-01-19
Payer: MEDICARE

## 2023-02-10 ENCOUNTER — TRANSFERRED RECORDS (OUTPATIENT)
Dept: HEALTH INFORMATION MANAGEMENT | Facility: CLINIC | Age: 78
End: 2023-02-10
Payer: MEDICARE

## 2023-04-01 ENCOUNTER — HEALTH MAINTENANCE LETTER (OUTPATIENT)
Age: 78
End: 2023-04-01

## 2023-04-19 ENCOUNTER — TRANSFERRED RECORDS (OUTPATIENT)
Dept: HEALTH INFORMATION MANAGEMENT | Facility: CLINIC | Age: 78
End: 2023-04-19
Payer: MEDICARE

## 2023-07-15 DIAGNOSIS — N40.1 BPH WITH OBSTRUCTION/LOWER URINARY TRACT SYMPTOMS: ICD-10-CM

## 2023-07-15 DIAGNOSIS — N13.8 BPH WITH OBSTRUCTION/LOWER URINARY TRACT SYMPTOMS: ICD-10-CM

## 2023-07-18 RX ORDER — ALFUZOSIN HYDROCHLORIDE 10 MG/1
TABLET, EXTENDED RELEASE ORAL
Qty: 90 TABLET | Refills: 0 | Status: SHIPPED | OUTPATIENT
Start: 2023-07-18 | End: 2023-08-15

## 2023-08-11 DIAGNOSIS — N13.8 BPH WITH OBSTRUCTION/LOWER URINARY TRACT SYMPTOMS: ICD-10-CM

## 2023-08-11 DIAGNOSIS — N40.1 BPH WITH OBSTRUCTION/LOWER URINARY TRACT SYMPTOMS: ICD-10-CM

## 2023-08-15 RX ORDER — ALFUZOSIN HYDROCHLORIDE 10 MG/1
TABLET, EXTENDED RELEASE ORAL
Qty: 90 TABLET | Refills: 0 | Status: SHIPPED | OUTPATIENT
Start: 2023-08-15

## 2024-01-03 ENCOUNTER — TRANSFERRED RECORDS (OUTPATIENT)
Dept: HEALTH INFORMATION MANAGEMENT | Facility: CLINIC | Age: 79
End: 2024-01-03
Payer: MEDICARE

## 2024-06-02 ENCOUNTER — HEALTH MAINTENANCE LETTER (OUTPATIENT)
Age: 79
End: 2024-06-02

## 2025-01-07 ENCOUNTER — TRANSFERRED RECORDS (OUTPATIENT)
Dept: HEALTH INFORMATION MANAGEMENT | Facility: CLINIC | Age: 80
End: 2025-01-07
Payer: MEDICARE

## 2025-06-15 ENCOUNTER — HEALTH MAINTENANCE LETTER (OUTPATIENT)
Age: 80
End: 2025-06-15

## 2025-07-15 ENCOUNTER — TRANSFERRED RECORDS (OUTPATIENT)
Dept: HEALTH INFORMATION MANAGEMENT | Facility: CLINIC | Age: 80
End: 2025-07-15
Payer: MEDICARE

## (undated) DEVICE — BAG CYSTO TABLE DRAIN

## (undated) DEVICE — TUBING IRR TUR Y TYPE 2C4041

## (undated) DEVICE — PAD CHUX UNDERPAD 23X24" 7136

## (undated) DEVICE — Device

## (undated) DEVICE — PACK CYSTOSCOPY SBA15CYFSI

## (undated) DEVICE — GUIDEWIRE ZIPWIRE STR STIFF .035"X150CM M006630222B0

## (undated) DEVICE — CATH URETERAL OPEN END 6FR AXXCESS

## (undated) DEVICE — GLOVE PROTEXIS W/NEU-THERA 7.5  2D73TE75

## (undated) DEVICE — BASKET GEMINI 0 TIP HELICAL

## (undated) DEVICE — SOL WATER IRRIG 1000ML BOTTLE 2F7114

## (undated) RX ORDER — PROPOFOL 10 MG/ML
INJECTION, EMULSION INTRAVENOUS
Status: DISPENSED
Start: 2018-06-29

## (undated) RX ORDER — FENTANYL CITRATE 50 UG/ML
INJECTION, SOLUTION INTRAMUSCULAR; INTRAVENOUS
Status: DISPENSED
Start: 2018-06-29

## (undated) RX ORDER — ONDANSETRON 2 MG/ML
INJECTION INTRAMUSCULAR; INTRAVENOUS
Status: DISPENSED
Start: 2018-06-29

## (undated) RX ORDER — DEXAMETHASONE SODIUM PHOSPHATE 4 MG/ML
INJECTION, SOLUTION INTRA-ARTICULAR; INTRALESIONAL; INTRAMUSCULAR; INTRAVENOUS; SOFT TISSUE
Status: DISPENSED
Start: 2018-06-29

## (undated) RX ORDER — LIDOCAINE HYDROCHLORIDE 20 MG/ML
INJECTION, SOLUTION EPIDURAL; INFILTRATION; INTRACAUDAL; PERINEURAL
Status: DISPENSED
Start: 2018-06-29

## (undated) RX ORDER — CIPROFLOXACIN 2 MG/ML
INJECTION, SOLUTION INTRAVENOUS
Status: DISPENSED
Start: 2018-06-29